# Patient Record
Sex: MALE | Race: WHITE | Employment: UNEMPLOYED | URBAN - METROPOLITAN AREA
[De-identification: names, ages, dates, MRNs, and addresses within clinical notes are randomized per-mention and may not be internally consistent; named-entity substitution may affect disease eponyms.]

---

## 2018-01-31 ENCOUNTER — APPOINTMENT (OUTPATIENT)
Dept: CT IMAGING | Age: 53
End: 2018-01-31
Attending: EMERGENCY MEDICINE
Payer: COMMERCIAL

## 2018-01-31 ENCOUNTER — HOSPITAL ENCOUNTER (EMERGENCY)
Age: 53
Discharge: OTHER INSTITUTION | End: 2018-01-31
Attending: EMERGENCY MEDICINE
Payer: COMMERCIAL

## 2018-01-31 ENCOUNTER — APPOINTMENT (OUTPATIENT)
Dept: GENERAL RADIOLOGY | Age: 53
End: 2018-01-31
Attending: EMERGENCY MEDICINE
Payer: COMMERCIAL

## 2018-01-31 VITALS
OXYGEN SATURATION: 96 % | TEMPERATURE: 97.7 F | RESPIRATION RATE: 18 BRPM | HEIGHT: 75 IN | BODY MASS INDEX: 34.82 KG/M2 | SYSTOLIC BLOOD PRESSURE: 142 MMHG | DIASTOLIC BLOOD PRESSURE: 71 MMHG | HEART RATE: 75 BPM | WEIGHT: 280 LBS

## 2018-01-31 DIAGNOSIS — R42 VERTIGO: Primary | ICD-10-CM

## 2018-01-31 DIAGNOSIS — R11.10 VOMITING, INTRACTABILITY OF VOMITING NOT SPECIFIED, PRESENCE OF NAUSEA NOT SPECIFIED, UNSPECIFIED VOMITING TYPE: ICD-10-CM

## 2018-01-31 LAB
ALBUMIN SERPL-MCNC: 3.6 G/DL (ref 3.5–5)
ALBUMIN/GLOB SERPL: 1.1 {RATIO} (ref 1.1–2.2)
ALP SERPL-CCNC: 76 U/L (ref 45–117)
ALT SERPL-CCNC: 24 U/L (ref 12–78)
ANION GAP SERPL CALC-SCNC: 10 MMOL/L (ref 5–15)
APPEARANCE UR: CLEAR
AST SERPL-CCNC: 13 U/L (ref 15–37)
ATRIAL RATE: 61 BPM
BACTERIA URNS QL MICRO: NEGATIVE /HPF
BASOPHILS # BLD: 0.1 K/UL (ref 0–0.1)
BASOPHILS NFR BLD: 1 % (ref 0–1)
BILIRUB SERPL-MCNC: 0.6 MG/DL (ref 0.2–1)
BILIRUB UR QL: NEGATIVE
BUN SERPL-MCNC: 14 MG/DL (ref 6–20)
BUN/CREAT SERPL: 15 (ref 12–20)
CALCIUM SERPL-MCNC: 8.4 MG/DL (ref 8.5–10.1)
CALCULATED P AXIS, ECG09: 1 DEGREES
CALCULATED R AXIS, ECG10: -43 DEGREES
CALCULATED T AXIS, ECG11: 65 DEGREES
CHLORIDE SERPL-SCNC: 104 MMOL/L (ref 97–108)
CO2 SERPL-SCNC: 24 MMOL/L (ref 21–32)
COLOR UR: ABNORMAL
CREAT SERPL-MCNC: 0.96 MG/DL (ref 0.7–1.3)
DIAGNOSIS, 93000: NORMAL
DIFFERENTIAL METHOD BLD: ABNORMAL
EOSINOPHIL # BLD: 0.4 K/UL (ref 0–0.4)
EOSINOPHIL NFR BLD: 4 % (ref 0–7)
EPITH CASTS URNS QL MICRO: ABNORMAL /LPF
ERYTHROCYTE [DISTWIDTH] IN BLOOD BY AUTOMATED COUNT: 12.7 % (ref 11.5–14.5)
GLOBULIN SER CALC-MCNC: 3.3 G/DL (ref 2–4)
GLUCOSE BLD STRIP.AUTO-MCNC: 334 MG/DL (ref 65–100)
GLUCOSE SERPL-MCNC: 336 MG/DL (ref 65–100)
GLUCOSE UR STRIP.AUTO-MCNC: >1000 MG/DL
HCT VFR BLD AUTO: 40.2 % (ref 36.6–50.3)
HGB BLD-MCNC: 13.8 G/DL (ref 12.1–17)
HGB UR QL STRIP: NEGATIVE
HYALINE CASTS URNS QL MICRO: ABNORMAL /LPF (ref 0–5)
IMM GRANULOCYTES # BLD: 0.3 K/UL (ref 0–0.04)
IMM GRANULOCYTES NFR BLD AUTO: 3 % (ref 0–0.5)
KETONES UR QL STRIP.AUTO: 40 MG/DL
LACTATE SERPL-SCNC: 2 MMOL/L (ref 0.4–2)
LEUKOCYTE ESTERASE UR QL STRIP.AUTO: NEGATIVE
LIPASE SERPL-CCNC: 113 U/L (ref 73–393)
LYMPHOCYTES # BLD: 1.7 K/UL (ref 0.8–3.5)
LYMPHOCYTES NFR BLD: 17 % (ref 12–49)
MAGNESIUM SERPL-MCNC: 1.6 MG/DL (ref 1.6–2.4)
MCH RBC QN AUTO: 29.2 PG (ref 26–34)
MCHC RBC AUTO-ENTMCNC: 34.3 G/DL (ref 30–36.5)
MCV RBC AUTO: 85 FL (ref 80–99)
MONOCYTES # BLD: 0.6 K/UL (ref 0–1)
MONOCYTES NFR BLD: 6 % (ref 5–13)
NEUTS SEG # BLD: 6.9 K/UL (ref 1.8–8)
NEUTS SEG NFR BLD: 69 % (ref 32–75)
NITRITE UR QL STRIP.AUTO: NEGATIVE
NRBC # BLD: 0 K/UL (ref 0–0.01)
NRBC BLD-RTO: 0 PER 100 WBC
P-R INTERVAL, ECG05: 172 MS
PH UR STRIP: 5 [PH] (ref 5–8)
PLATELET # BLD AUTO: 236 K/UL (ref 150–400)
PMV BLD AUTO: 9.7 FL (ref 8.9–12.9)
POTASSIUM SERPL-SCNC: 3.6 MMOL/L (ref 3.5–5.1)
PROT SERPL-MCNC: 6.9 G/DL (ref 6.4–8.2)
PROT UR STRIP-MCNC: NEGATIVE MG/DL
Q-T INTERVAL, ECG07: 446 MS
QRS DURATION, ECG06: 114 MS
QTC CALCULATION (BEZET), ECG08: 448 MS
RBC # BLD AUTO: 4.73 M/UL (ref 4.1–5.7)
RBC #/AREA URNS HPF: ABNORMAL /HPF (ref 0–5)
RBC MORPH BLD: ABNORMAL
SERVICE CMNT-IMP: ABNORMAL
SODIUM SERPL-SCNC: 138 MMOL/L (ref 136–145)
SP GR UR REFRACTOMETRY: 1.02 (ref 1–1.03)
TROPONIN I SERPL-MCNC: <0.04 NG/ML
UA: UC IF INDICATED,UAUC: ABNORMAL
UROBILINOGEN UR QL STRIP.AUTO: 0.2 EU/DL (ref 0.2–1)
VENTRICULAR RATE, ECG03: 61 BPM
WBC # BLD AUTO: 10 K/UL (ref 4.1–11.1)
WBC URNS QL MICRO: ABNORMAL /HPF (ref 0–4)

## 2018-01-31 PROCEDURE — 80053 COMPREHEN METABOLIC PANEL: CPT | Performed by: EMERGENCY MEDICINE

## 2018-01-31 PROCEDURE — 84484 ASSAY OF TROPONIN QUANT: CPT | Performed by: EMERGENCY MEDICINE

## 2018-01-31 PROCEDURE — G8979 MOBILITY GOAL STATUS: HCPCS

## 2018-01-31 PROCEDURE — 74011000258 HC RX REV CODE- 258: Performed by: EMERGENCY MEDICINE

## 2018-01-31 PROCEDURE — 97530 THERAPEUTIC ACTIVITIES: CPT

## 2018-01-31 PROCEDURE — G8978 MOBILITY CURRENT STATUS: HCPCS

## 2018-01-31 PROCEDURE — 93005 ELECTROCARDIOGRAM TRACING: CPT

## 2018-01-31 PROCEDURE — 83735 ASSAY OF MAGNESIUM: CPT | Performed by: EMERGENCY MEDICINE

## 2018-01-31 PROCEDURE — 36415 COLL VENOUS BLD VENIPUNCTURE: CPT | Performed by: EMERGENCY MEDICINE

## 2018-01-31 PROCEDURE — 85025 COMPLETE CBC W/AUTO DIFF WBC: CPT | Performed by: EMERGENCY MEDICINE

## 2018-01-31 PROCEDURE — 74011000250 HC RX REV CODE- 250: Performed by: EMERGENCY MEDICINE

## 2018-01-31 PROCEDURE — 74011250636 HC RX REV CODE- 250/636

## 2018-01-31 PROCEDURE — 74022 RADEX COMPL AQT ABD SERIES: CPT

## 2018-01-31 PROCEDURE — 74011250637 HC RX REV CODE- 250/637: Performed by: EMERGENCY MEDICINE

## 2018-01-31 PROCEDURE — 82962 GLUCOSE BLOOD TEST: CPT

## 2018-01-31 PROCEDURE — 70450 CT HEAD/BRAIN W/O DYE: CPT

## 2018-01-31 PROCEDURE — 96365 THER/PROPH/DIAG IV INF INIT: CPT

## 2018-01-31 PROCEDURE — 81001 URINALYSIS AUTO W/SCOPE: CPT | Performed by: EMERGENCY MEDICINE

## 2018-01-31 PROCEDURE — 97162 PT EVAL MOD COMPLEX 30 MIN: CPT

## 2018-01-31 PROCEDURE — 83690 ASSAY OF LIPASE: CPT | Performed by: EMERGENCY MEDICINE

## 2018-01-31 PROCEDURE — 74011250636 HC RX REV CODE- 250/636: Performed by: EMERGENCY MEDICINE

## 2018-01-31 PROCEDURE — 99285 EMERGENCY DEPT VISIT HI MDM: CPT

## 2018-01-31 PROCEDURE — G8980 MOBILITY D/C STATUS: HCPCS

## 2018-01-31 PROCEDURE — 96375 TX/PRO/DX INJ NEW DRUG ADDON: CPT

## 2018-01-31 PROCEDURE — 83605 ASSAY OF LACTIC ACID: CPT | Performed by: EMERGENCY MEDICINE

## 2018-01-31 RX ORDER — ONDANSETRON 2 MG/ML
INJECTION INTRAMUSCULAR; INTRAVENOUS
Status: COMPLETED
Start: 2018-01-31 | End: 2018-01-31

## 2018-01-31 RX ORDER — ONDANSETRON 2 MG/ML
4 INJECTION INTRAMUSCULAR; INTRAVENOUS
Status: COMPLETED | OUTPATIENT
Start: 2018-01-31 | End: 2018-01-31

## 2018-01-31 RX ORDER — MECLIZINE HYDROCHLORIDE 25 MG/1
25 TABLET ORAL
Qty: 15 TAB | Refills: 0 | Status: SHIPPED | OUTPATIENT
Start: 2018-01-31 | End: 2018-02-10

## 2018-01-31 RX ORDER — PROMETHAZINE HYDROCHLORIDE 50 MG/1
50 SUPPOSITORY RECTAL
Qty: 12 SUPPOSITORY | Refills: 0 | Status: SHIPPED | OUTPATIENT
Start: 2018-01-31 | End: 2019-03-26 | Stop reason: ALTCHOICE

## 2018-01-31 RX ORDER — MECLIZINE HCL 12.5 MG 12.5 MG/1
25 TABLET ORAL
Status: COMPLETED | OUTPATIENT
Start: 2018-01-31 | End: 2018-01-31

## 2018-01-31 RX ORDER — DIAZEPAM 10 MG/2ML
5 INJECTION INTRAMUSCULAR
Status: DISCONTINUED | OUTPATIENT
Start: 2018-01-31 | End: 2018-01-31

## 2018-01-31 RX ORDER — DIAZEPAM 5 MG/1
5 TABLET ORAL
Status: COMPLETED | OUTPATIENT
Start: 2018-01-31 | End: 2018-01-31

## 2018-01-31 RX ADMIN — PROMETHAZINE HYDROCHLORIDE 12.5 MG: 25 INJECTION, SOLUTION INTRAMUSCULAR; INTRAVENOUS at 14:27

## 2018-01-31 RX ADMIN — DIAZEPAM 5 MG: 5 TABLET ORAL at 16:22

## 2018-01-31 RX ADMIN — ONDANSETRON 4 MG: 2 INJECTION INTRAMUSCULAR; INTRAVENOUS at 12:05

## 2018-01-31 RX ADMIN — MECLIZINE 25 MG: 12.5 TABLET ORAL at 16:23

## 2018-01-31 RX ADMIN — ONDANSETRON HYDROCHLORIDE 4 MG: 2 INJECTION, SOLUTION INTRAMUSCULAR; INTRAVENOUS at 12:05

## 2018-01-31 RX ADMIN — SODIUM CHLORIDE 5 MG: 9 INJECTION INTRAMUSCULAR; INTRAVENOUS; SUBCUTANEOUS at 13:03

## 2018-01-31 NOTE — ED PROVIDER NOTES
EMERGENCY DEPARTMENT HISTORY AND PHYSICAL EXAM      Date: 1/31/2018  Patient Name: Daniel Encinas    History of Presenting Illness     Chief Complaint   Patient presents with    Syncope     patient had a near syncopal event while in the shower this morning, denies CP       History Provided By: Patient    HPI: Daniel Encinas, 46 y.o. male with PMHx significant for DM, HTN, HLD, presents via EMS to the ED with cc of near syncope this morning. Pt states that this morning he began to feel mild to moderate, acute lightheadedness after showering followed by onset of nausea and intermittent vomiting. He states that he laid down on the floor of the bathroom as a result followed by near-syncope. He notes that he has a hx of T2 DM but has not been checking his blood glucose levels recently. He denies any use of supplemental oxygen at home. En route, pt was provided Zofran 4 mg by EMS without relief of nausea. He notes symptoms are worse with movement and have remained constant since this morning. Blood glucose was 284 with EMS. He denies any CP, abdominal pain, SOB, or OSMAN. PCP: Lele Washington MD    There are no other complaints, changes, or physical findings at this time. Current Outpatient Prescriptions   Medication Sig Dispense Refill    meclizine (ANTIVERT) 25 mg tablet Take 1 Tab by mouth three (3) times daily as needed for up to 10 days. 15 Tab 0    promethazine (PHENERGAN) 50 mg suppository Insert 1 Suppository into rectum every six (6) hours as needed for Nausea. 12 Suppository 0    metFORMIN (GLUCOPHAGE) 500 mg tablet Take 1,000 mg by mouth two (2) times daily (with meals).  glipiZIDE (GLUCOTROL) 5 mg tablet Take 10 mg by mouth two (2) times a day.  lisinopril (PRINIVIL, ZESTRIL) 20 mg tablet Take 20 mg by mouth daily.  simvastatin (ZOCOR) 20 mg tablet Take 20 mg by mouth nightly.         cyclobenzaprine (FLEXERIL) 10 mg tablet Take 1 Tab by mouth three (3) times daily as needed for Muscle Spasm(s) for 12 doses. 12 Tab 0       Past History     Past Medical History:  Past Medical History:   Diagnosis Date    Diabetes ([facility] [address]     Essential hypertension     Hyperlipidemia     Hypertension     Other ill-defined conditions(799.89)     high cholesterol       Past Surgical History:  History reviewed. No pertinent surgical history. Family History:  Family History   Problem Relation Age of Onset    Coronary Artery Disease Maternal Grandmother     Coronary Artery Disease Paternal Grandmother        Social History:  Social History   Substance Use Topics    Smoking status: Never Smoker    Smokeless tobacco: Current User      Comment: snuff    Alcohol use No       Allergies:  No Known Allergies      Review of Systems   Review of Systems   Constitutional: Negative for chills and fever. HENT: Negative for congestion, mouth sores and sore throat. Eyes: Negative for visual disturbance. Respiratory: Negative for cough and shortness of breath. Cardiovascular: Negative for chest pain and leg swelling. Gastrointestinal: Positive for nausea and vomiting. Negative for abdominal pain, blood in stool and diarrhea. Endocrine: Negative for polyuria. Genitourinary: Negative for dysuria and testicular pain. Musculoskeletal: Negative for arthralgias, joint swelling and myalgias. Skin: Negative for rash. Allergic/Immunologic: Negative for immunocompromised state. Neurological: Positive for syncope (near) and light-headedness. Negative for weakness and headaches. Hematological: Does not bruise/bleed easily. Psychiatric/Behavioral: Negative for confusion. Physical Exam   Physical Exam   Constitutional: He is oriented to person, place, and time. He appears well-developed and well-nourished. HENT:   Head: Normocephalic and atraumatic. Moist mucous membranes   Eyes: Conjunctivae are normal. Pupils are equal, round, and reactive to light. Right eye exhibits no discharge.  Left eye exhibits no discharge. Neck: Normal range of motion. Neck supple. No tracheal deviation present. Cardiovascular: Normal rate, regular rhythm and normal heart sounds. No murmur heard. Pulmonary/Chest: Effort normal and breath sounds normal. No respiratory distress. He has no wheezes. He has no rales. Abdominal: Soft. Bowel sounds are normal. There is no tenderness. There is no rebound and no guarding. Musculoskeletal: Normal range of motion. He exhibits no edema, tenderness or deformity. Neurological: He is alert and oriented to person, place, and time. Skin: Skin is dry. No rash noted. No erythema. There is pallor. Extremities cool to touch. Psychiatric: His behavior is normal.   Nursing note and vitals reviewed.       Diagnostic Study Results     Labs -  Recent Results (from the past 12 hour(s))   GLUCOSE, POC    Collection Time: 01/31/18 11:41 AM   Result Value Ref Range    Glucose (POC) 334 (H) 65 - 100 mg/dL    Performed by Mirta Gonzalez    EKG, 12 LEAD, INITIAL    Collection Time: 01/31/18 11:53 AM   Result Value Ref Range    Ventricular Rate 61 BPM    Atrial Rate 61 BPM    P-R Interval 172 ms    QRS Duration 114 ms    Q-T Interval 446 ms    QTC Calculation (Bezet) 448 ms    Calculated P Axis 1 degrees    Calculated R Axis -43 degrees    Calculated T Axis 65 degrees    Diagnosis       Normal sinus rhythm  Left axis deviation  Right bundle branch block      Confirmed by Dimitri Clark ([id number]) on 1/31/2018 5:54:49 PM     CBC WITH AUTOMATED DIFF    Collection Time: 01/31/18 12:20 PM   Result Value Ref Range    WBC 10.0 4.1 - 11.1 K/uL    RBC 4.73 4.10 - 5.70 M/uL    HGB 13.8 12.1 - 17.0 g/dL    HCT 40.2 36.6 - 50.3 %    MCV 85.0 80.0 - 99.0 FL    MCH 29.2 26.0 - 34.0 PG    MCHC 34.3 30.0 - 36.5 g/dL    RDW 12.7 11.5 - 14.5 %    PLATELET 885 257 - 524 K/uL    MPV 9.7 8.9 - 12.9 FL    NRBC 0.0 0  WBC    ABSOLUTE NRBC 0.00 0.00 - 0.01 K/uL    NEUTROPHILS 69 32 - 75 %    LYMPHOCYTES 17 12 - 49 %    MONOCYTES 6 5 - 13 %    EOSINOPHILS 4 0 - 7 %    BASOPHILS 1 0 - 1 %    IMMATURE GRANULOCYTES 3 (H) 0.0 - 0.5 %    ABS. NEUTROPHILS 6.9 1.8 - 8.0 K/UL    ABS. LYMPHOCYTES 1.7 0.8 - 3.5 K/UL    ABS. MONOCYTES 0.6 0.0 - 1.0 K/UL    ABS. EOSINOPHILS 0.4 0.0 - 0.4 K/UL    ABS. BASOPHILS 0.1 0.0 - 0.1 K/UL    ABS. IMM. GRANS. 0.3 (H) 0.00 - 0.04 K/UL    DF SMEAR SCANNED      RBC COMMENTS NORMOCYTIC, NORMOCHROMIC     LACTIC ACID    Collection Time: 01/31/18 12:20 PM   Result Value Ref Range    Lactic acid 2.0 0.4 - 2.0 MMOL/L   METABOLIC PANEL, COMPREHENSIVE    Collection Time: 01/31/18  1:51 PM   Result Value Ref Range    Sodium 138 136 - 145 mmol/L    Potassium 3.6 3.5 - 5.1 mmol/L    Chloride 104 97 - 108 mmol/L    CO2 24 21 - 32 mmol/L    Anion gap 10 5 - 15 mmol/L    Glucose 336 (H) 65 - 100 mg/dL    BUN 14 6 - 20 MG/DL    Creatinine 0.96 0.70 - 1.30 MG/DL    BUN/Creatinine ratio 15 12 - 20      GFR est AA >60 >60 ml/min/1.73m2    GFR est non-AA >60 >60 ml/min/1.73m2    Calcium 8.4 (L) 8.5 - 10.1 MG/DL    Bilirubin, total 0.6 0.2 - 1.0 MG/DL    ALT (SGPT) 24 12 - 78 U/L    AST (SGOT) 13 (L) 15 - 37 U/L    Alk.  phosphatase 76 45 - 117 U/L    Protein, total 6.9 6.4 - 8.2 g/dL    Albumin 3.6 3.5 - 5.0 g/dL    Globulin 3.3 2.0 - 4.0 g/dL    A-G Ratio 1.1 1.1 - 2.2     MAGNESIUM    Collection Time: 01/31/18  1:51 PM   Result Value Ref Range    Magnesium 1.6 1.6 - 2.4 mg/dL   TROPONIN I    Collection Time: 01/31/18  1:51 PM   Result Value Ref Range    Troponin-I, Qt. <0.04 <0.05 ng/mL   LIPASE    Collection Time: 01/31/18  1:51 PM   Result Value Ref Range    Lipase 113 73 - 393 U/L   URINALYSIS W/ REFLEX CULTURE    Collection Time: 01/31/18  3:28 PM   Result Value Ref Range    Color YELLOW/STRAW      Appearance CLEAR CLEAR      Specific gravity 1.020 1.003 - 1.030      pH (UA) 5.0 5.0 - 8.0      Protein NEGATIVE  NEG mg/dL    Glucose >1000 (A) NEG mg/dL    Ketone 40 (A) NEG mg/dL    Bilirubin NEGATIVE  NEG Blood NEGATIVE  NEG      Urobilinogen 0.2 0.2 - 1.0 EU/dL    Nitrites NEGATIVE  NEG      Leukocyte Esterase NEGATIVE  NEG      UA:UC IF INDICATED CULTURE NOT INDICATED BY UA RESULT CNI      WBC 0-4 0 - 4 /hpf    RBC 0-5 0 - 5 /hpf    Epithelial cells FEW FEW /lpf    Bacteria NEGATIVE  NEG /hpf    Hyaline cast 0-2 0 - 5 /lpf       Radiologic Studies -  CXR Results  (Last 48 hours)               01/31/18 1329  XR ABD ACUTE W 1 V CHEST Final result    Impression:  IMPRESSION: No acute findings. Narrative:  EXAM:  XR ABD ACUTE W 1 V CHEST       INDICATION:  Nausea, vomiting since this morning. COMPARISON: None. FINDINGS: The upright chest radiograph demonstrates clear lungs and normal   cardiac and mediastinal contours. There is no pleural effusion or free air under   the diaphragm. Supine and left decubitus views of the abdomen demonstrate a nonobstructive   bowel gas pattern. There is no free intraperitoneal air. Mild retained fecal   material is shown throughout the colon. Mild lumbar spine degenerative changes   are shown. Medical Decision Making   I am the first provider for this patient. I reviewed the vital signs, available nursing notes, past medical history, past surgical history, family history and social history. Vital Signs-Reviewed the patient's vital signs. Patient Vitals for the past 12 hrs:   Temp Pulse Resp BP SpO2   01/31/18 1430 - 70 18 149/78 96 %   01/31/18 1400 - 70 18 136/66 96 %   01/31/18 1304 - 67 18 144/64 91 %   01/31/18 1138 97.7 °F (36.5 °C) 65 18 156/74 99 %       Pulse Oximetry Analysis - 94% on RA    Cardiac Monitor:   Rate: 68 bpm  Rhythm: Normal Sinus Rhythm      EKG interpretation: (Preliminary) 1158  Rhythm: normal sinus rhythm and RBBB; and regular . Rate (approx.): 61; Axis: left axis deviation; CO interval: normal; QRS interval: prolonged; ST/T wave: no ischemic changes. QT/QTc: 446/448  Written by Jose Funez. Dru ED Scribe, as dictated by Simone Renteria DO. Records Reviewed: Nursing Notes, Old Medical Records and Previous Laboratory Studies    Provider Notes (Medical Decision Making):   Syncope likely related to vasovagal episode vs volume depletion. Will obtain labs, provide IV fluids, and disposition pending symptomatic treatment and lab results. ED Course:   Initial assessment performed. The patients presenting problems have been discussed, and they are in agreement with the care plan formulated and outlined with them. I have encouraged them to ask questions as they arise throughout their visit. 4:33 PM  Having re-evaluated pt. When sitting him up in bed and having him turn his head, he rpeorts feeling sick. Will speak with PT to have pt evaluated. SIGN OUT NOTE:  4:53 PM  Patient's presentation, labs/imaging and plan of care was reviewed with Shree Patten MD as part of sign out. They will follow up on the result of pt's PT evaluation and discharge home pending as part of the plan discussed with the patient. Shree Patten MD's assistance in completion of this plan is greatly appreciated but it should be noted that I will be the provider of record for this patient. Simone Renteria DO.    5:02 PM  Pt ambulated by PT. She states that he had slight increase in symptoms with left rotation of head but symptoms were not described as vertigo-dizziness. Attempted Epley maneuver but with no change in symptoms. Pt with poor balance while sitting and unable to stand safely with significant assistance of 2 people. PT states central workup of central cause warranted. Disposition:    TRANSFER NOTE:  7:18 PM  Pt is being transferred to ED at [facility], transfer accepted by Dr. Maria Dolores Laurent. The reasons for pt's transfer have been discussed with the pt and available family. They convey agreement and understanding for the need to be transferred as explained to them by Shree Patten MD.      PLAN:  1.    Current Discharge Medication List      START taking these medications    Details   meclizine (ANTIVERT) 25 mg tablet Take 1 Tab by mouth three (3) times daily as needed for up to 10 days. Qty: 15 Tab, Refills: 0      promethazine (PHENERGAN) 50 mg suppository Insert 1 Suppository into rectum every six (6) hours as needed for Nausea. Qty: 12 Suppository, Refills: 0           2. Follow-up Information     Follow up With Details Comments Olivia Almanzar III, MD Call in 1 day  [address] [facility] [phone]      [facility] EMERGENCY DEPT  If symptoms worsen [address]  [address] [facility]    Emily Arshad MD Call in 1 day  [address] [facility]  [phone]          Return to ED if worse     Diagnosis     Clinical Impression:   1. Vertigo    2. Vomiting, intractability of vomiting not specified, presence of nausea not specified, unspecified vomiting type        Attestations: This note is prepared by Elvin Lovett, acting as Scribe for Luis Karimi DO. Luis Karimi DO: The scribe's documentation has been prepared under my direction and personally reviewed by me in its entirety. I confirm that the note above accurately reflects all work, treatment, procedures, and medical decision making performed by me.

## 2018-01-31 NOTE — ED NOTES
Patient arrival via EMS, near syncope during shower, patient found seated in the shower,  , nausea, 4 mg Zofran given by EMS. Patient denies CP.

## 2018-01-31 NOTE — ED NOTES
Patient has had 2 episodes of vomiting, states he feels dizzy, room is moving back and forth. Denies pain.

## 2018-01-31 NOTE — DISCHARGE INSTRUCTIONS
Dizziness: Care Instructions  Your Care Instructions  Dizziness is the feeling of unsteadiness or fuzziness in your head. It is different than having vertigo, which is a feeling that the room is spinning or that you are moving or falling. It is also different from lightheadedness, which is the feeling that you are about to faint. It can be hard to know what causes dizziness. Some people feel dizzy when they have migraine headaches. Sometimes bouts of flu can make you feel dizzy. Some medical conditions, such as heart problems or high blood pressure, can make you feel dizzy. Many medicines can cause dizziness, including medicines for high blood pressure, pain, or anxiety. If a medicine causes your symptoms, your doctor may recommend that you stop or change the medicine. If it is a problem with your heart, you may need medicine to help your heart work better. If there is no clear reason for your symptoms, your doctor may suggest watching and waiting for a while to see if the dizziness goes away on its own. Follow-up care is a key part of your treatment and safety. Be sure to make and go to all appointments, and call your doctor if you are having problems. It's also a good idea to know your test results and keep a list of the medicines you take. How can you care for yourself at home? · If your doctor recommends or prescribes medicine, take it exactly as directed. Call your doctor if you think you are having a problem with your medicine. · Do not drive while you feel dizzy. · Try to prevent falls. Steps you can take include:  ¨ Using nonskid mats, adding grab bars near the tub, and using night-lights. ¨ Clearing your home so that walkways are free of anything you might trip on. ¨ Letting family and friends know that you have been feeling dizzy. This will help them know how to help you. When should you call for help? Call 911 anytime you think you may need emergency care.  For example, call if:  ? · You passed out (lost consciousness). ? · You have dizziness along with symptoms of a heart attack. These may include:  ¨ Chest pain or pressure, or a strange feeling in the chest.  ¨ Sweating. ¨ Shortness of breath. ¨ Nausea or vomiting. ¨ Pain, pressure, or a strange feeling in the back, neck, jaw, or upper belly or in one or both shoulders or arms. ¨ Lightheadedness or sudden weakness. ¨ A fast or irregular heartbeat. ? · You have symptoms of a stroke. These may include:  ¨ Sudden numbness, tingling, weakness, or loss of movement in your face, arm, or leg, especially on only one side of your body. ¨ Sudden vision changes. ¨ Sudden trouble speaking. ¨ Sudden confusion or trouble understanding simple statements. ¨ Sudden problems with walking or balance. ¨ A sudden, severe headache that is different from past headaches. ?Call your doctor now or seek immediate medical care if:  ? · You feel dizzy and have a fever, headache, or ringing in your ears. ? · You have new or increased nausea and vomiting. ? · Your dizziness does not go away or comes back. ? Watch closely for changes in your health, and be sure to contact your doctor if:  ? · You do not get better as expected. Where can you learn more? Go to http://ernestina-lidia.info/. Enter W743 in the search box to learn more about \"Dizziness: Care Instructions. \"  Current as of: March 20, 2017  Content Version: 11.4  © 4646-3195 [employer]. Care instructions adapted under license by "[employer]" (which disclaims liability or warranty for this information). If you have questions about a medical condition or this instruction, always ask your healthcare professional. [place] [address] any warranty or liability for your use of this information.          Nausea and Vomiting: Care Instructions  Your Care Instructions    When you are nauseated, you may feel weak and sweaty and notice a lot of saliva in your mouth. Nausea often leads to vomiting. Most of the time you do not need to worry about nausea and vomiting, but they can be signs of other illnesses. Two common causes of nausea and vomiting are stomach flu and food poisoning. Nausea and vomiting from viral stomach flu will usually start to improve within 24 hours. Nausea and vomiting from food poisoning may last from 12 to 48 hours. The doctor has checked you carefully, but problems can develop later. If you notice any problems or new symptoms, get medical treatment right away. Follow-up care is a key part of your treatment and safety. Be sure to make and go to all appointments, and call your doctor if you are having problems. It's also a good idea to know your test results and keep a list of the medicines you take. How can you care for yourself at home? · To prevent dehydration, drink plenty of fluids, enough so that your urine is light yellow or clear like water. Choose water and other caffeine-free clear liquids until you feel better. If you have kidney, heart, or liver disease and have to limit fluids, talk with your doctor before you increase the amount of fluids you drink. · Rest in bed until you feel better. · When you are able to eat, try clear soups, mild foods, and liquids until all symptoms are gone for 12 to 48 hours. Other good choices include dry toast, crackers, cooked cereal, and gelatin dessert, such as Jell-O. When should you call for help? Call 911 anytime you think you may need emergency care. For example, call if:  ? · You passed out (lost consciousness). ?Call your doctor now or seek immediate medical care if:  ? · You have symptoms of dehydration, such as:  ¨ Dry eyes and a dry mouth. ¨ Passing only a little dark urine. ¨ Feeling thirstier than usual.   ? · You have new or worsening belly pain. ? · You have a new or higher fever. ? · You vomit blood or what looks like coffee grounds. ? Watch closely for changes in your health, and be sure to contact your doctor if:  ? · You have ongoing nausea and vomiting. ? · Your vomiting is getting worse. ? · Your vomiting lasts longer than 2 days. ? · You are not getting better as expected. Where can you learn more? Go to http://ernestina-lidia.info/. Enter 25 [id number] in the search box to learn more about \"Nausea and Vomiting: Care Instructions. \"  Current as of: March 20, 2017  Content Version: 11.4  © 5460-7508 [employer]. Care instructions adapted under license by [employer] (which disclaims liability or warranty for this information). If you have questions about a medical condition or this instruction, always ask your healthcare professional. [address] any warranty or liability for your use of this information.

## 2018-01-31 NOTE — ED NOTES
Bedside shift change report given to Fauzia  (oncoming nurse) by Margie Roman (offgoing nurse). Report included the following information ED Summary. Pt provided urine specimen; dizziness noted when sitting, no vomiting at this time. Case discussed with MD.  Myles order med.

## 2018-01-31 NOTE — ED NOTES
Patient is on bedside monitor times 3. Patient resting in bed in position of comfort with call bell in reach, family at bedside.

## 2018-01-31 NOTE — PROGRESS NOTES
Pt is a 45 y/o  male who presented to the ED via EMS with a cc of near syncope this morning. CM met with pt re: assessment. CM met with pt, introduced self, role. Pt verbalized understanding. Pt verified demographic information on chart. CM updated pt's emergency contact as requested by pt. Pt confirmed he has [employer]. CM informed attending Monica Alvarado of [employer] for disposition. Pt stated he has no further questions or needs at this time. CM to continue to offer support, discharge planning as needed.      Richie Doshi, MSW  [facility]  [phone]

## 2018-01-31 NOTE — ED NOTES
Pt reports nausea and dry heaves. Medicated as ordered. Pt drank gingerale x2 after reporting nausea with some relief.

## 2018-01-31 NOTE — PROGRESS NOTES
physical Therapy Emergency Department EVALUATION with recommended admission  Patient: Keon Ruiz (48 y.o. male)  Date: 1/31/2018  Primary Diagnosis: There are no admission diagnoses documented for this encounter. Precautions:      ASSESSMENT :  Based on the objective data described below, the patient presents with c/o \"dizziness\" this am needing to lay himself on the floor of the bathroom due to inability to stand up. Initially thought to be possibly near syncope, but pt now c/o symptoms with head turns. Asked by Dr. Monica Alvarado to see pt for eval.  Pt lives in one story home with mother and brother. He is normally fully independent with no device, drives for work. Currently, pt describes \"feeling like my brain is sloshing\" or \"like my head or brain is tilted backward\". Does not endorse true spinning. He describes feeling present when turning to R and L and unclear of either side worse. He states he feels totally unstable when trying to sit unsupported. Smooth pursuit, saccades, and VOR appear intact. [place] Zeeshan Cones completed with only minor increase in symptoms to the Left and only briefly different than R. No appreciable nystagmus. Did attempt epley as for L involvement but did not result in typical peripheral response of increased sxs on 1st and 3rd positions. Upon sitting, pt exhibiting poor sitting balance with truncal ataxia needing min A for safety. Trunkal movement with latency as well. Attempted standing and pt with extremely poor control, lurching forward and unable to correct with need of max A of 1 and mod A of 1 to gain control and safely return to sitting. Upon sitting, continues with truncal ataxia. Pt returned to bed with assist of 2 for safety. At this time, pt's symptoms not presenting as typical for peripheral involvement . Rounded with MD and would recommend further assess, questionable for central causes given above presentation.  Pt would be unsafe at this time for d/c as he is unable to control sitting and standing and unable to amb. Admission for this patient is recommended with continued acute therapy. PLAN :  Frequency/Duration: Pending admission, the patient will be followed by physical therapy 5 times a week to address goals. If admitted, Recommendations and Planned Interventions:  [x]           Bed Mobility Training             []    Neuromuscular Re-Education  [x]           Transfer Training                   []    Orthotic/Prosthetic Training  [x]           Gait Training                         []    Modalities  [x]           Therapeutic Exercises           []    Edema Management/Control  [x]           Therapeutic Activities            [x]    Patient and Family Training/Education  []           Other (comment):      Discharge Recommendations:     []   Home with family  []   Skilled nursing facility  []   Admission to hospital with rehab likely needed  []   Inpatient rehab referral  []   Outpatient physical therapy referral  [x]   Other:dependent upon final dx and progress    Further Equipment Recommendations for Discharge:   []   Rolling walker with 5\" wheels  []   Crutches   []   [place]   []   Wheelchair   [x]   Other: TBD    COMMUNICATION/EDUCATION:   Communication/Collaboration:  [x]   Fall prevention education was provided and the patient/caregiver indicated understanding. [x]   Patient/family have participated as able and agree with findings and recommendations. []   Patient is unable to participate in plan of care at this time. Findings and recommendations were discussed with: MD physician and care manager       SUBJECTIVE:   Patient stated It feels like my brain is sloshing around or tilted.     OBJECTIVE DATA SUMMARY:   HISTORY:    Past Medical History:   Diagnosis Date    Diabetes ([address]     Essential hypertension     Hyperlipidemia     Hypertension     Other ill-defined conditions(799.89)     high cholesterol   History reviewed.  No pertinent surgical history. Prior Level of Function/Home Situation: Pt lives in one story home with mother and brother. He is normally fully independent with no device, drives for work. Personal factors and/or comorbidities impacting plan of care:     Home Situation  Home Environment: Private residence  # Steps to Enter: 3  Rails to Enter: Yes  One/Two Story Residence: One story  Living Alone: No  Support Systems: Family member(s) (lives with mother and brother)  Patient Expects to be Discharged to[de-identified] Private residence  Current DME Used/Available at Home: None    EXAMINATION/PRESENTATION/DECISION MAKING:   Critical Behavior:  Neurologic State: Alert  Orientation Level: Oriented X4  Cognition: Follows commands       Range Of Motion:  AROM: Within functional limits           PROM: Within functional limits           Strength:    Strength: Within functional limits                    Tone & Sensation:   Tone: Normal              Sensation: Intact               Coordination:  Coordination:  (slowed heel to shin L and +trunkal ataxia on sitting)  Vision:    does not describe oscillopsia      Functional Mobility:  Bed Mobility:  Rolling: Contact guard assistance  Supine to Sit: Minimum assistance;Assist x2  Sit to Supine: Minimum assistance;Assist x2     Transfers:  Sit to Stand:  Moderate assistance;Maximum assistance;Assist x2 (min to initiate w/ severe lurch forward/inability to [address])  Stand to Sit: Moderate assistance;Maximum assistance;Assist x2                       Balance:   Sitting: Impaired  Sitting - Static: Poor (constant support)  Sitting - Dynamic: Poor (constant support)  Standing: Impaired  Standing - Static: Poor  Standing - Dynamic :  (NT)  Ambulation/Gait Training:              Gait Description (WDL): Exceptions to WDL (unable to amb at this time)                            Special Tests:  Barthel Index:    Bathin  Bladder: 10  Bowels: 10  Groomin  Dressin  Feedin  Mobility: 0  Stairs: 0  Toilet Use: 0  Transfer (Bed to Chair and Back): 0  Total: 25       Barthel and G-code impairment scale:  Percentage of impairment CH  0% CI  1-19% CJ  20-39% CK  40-59% CL  60-79% CM  80-99% CN  100%   Barthel Score 0-100 100 99-80 79-60 59-40 20-39 1-19   0   Barthel Score 0-20 20 17-19 13-16 9-12 5-8 1-4 0      The Barthel ADL Index: Guidelines  1. The index should be used as a record of what a patient does, not as a record of what a patient could do. 2. The main aim is to establish degree of independence from any help, physical or verbal, however minor and for whatever reason. 3. The need for supervision renders the patient not independent. 4. A patient's performance should be established using the best available evidence. Asking the patient, friends/relatives and nurses are the usual sources, but direct observation and common sense are also important. However direct testing is not needed. 5. Usually the patient's performance over the preceding 24-48 hours is important, but occasionally longer periods will be relevant. 6. Middle categories imply that the patient supplies over 50 per cent of the effort. 7. Use of aids to be independent is allowed. Macie Go., Barthel, D.W. (2817). Functional evaluation: the Barthel Index. [facility] (14)2. RAMÍREZ Shepherd, Denise Collins., Francisco Javier Moss., [facility], [address] [place] [address] (1999). Measuring the change indisability after inpatient rehabilitation; comparison of the responsiveness of the Barthel Index and Functional [place] Measure. Journal of Neurology, Neurosurgery, and Psychiatry, 66(4), 673-722. Candance Magyar, N.JOEY.A, DEYSI Sargent, & Chalo Reddy M.A. (2004.) Assessment of post-stroke quality of life in cost-effectiveness studies: The usefulness of the Barthel Index and the EuroQoL-5D.  Quality of Life Research, 13, 427-43        In compliance with CMSs Claims Based Outcome Reporting, the following G-code set was chosen for this patient based on their primary functional limitation being treated: The outcome measure chosen to determine the severity of the functional limitation was the barthel with a score of 25/100 which was correlated with the impairment scale. ? Mobility - Walking and Moving Around:     - CURRENT STATUS: CL - 60%-79% impaired, limited or restricted    - GOAL STATUS: CL - 60%-79% impaired, limited or restricted    - D/C STATUS:  CL - 60%-79% impaired, limited or restricted     Physical Therapy Evaluation Charge Determination   History Examination Presentation Decision-Making   MEDIUM  Complexity : 1-2 comorbidities / personal factors will impact the outcome/ POC  HIGH Complexity : 4+ Standardized tests and measures addressing body structure, function, activity limitation and / or participation in recreation  HIGH Complexity : Unstable and unpredictable characteristics  MEDIUM Complexity : FOTO score of 26-74      Based on the above components, the patient evaluation is determined to be of the following complexity level: MEDIUM     Pain:  Pain Scale 1: Numeric (0 - 10)  Pain Intensity 1: 0     Activity Tolerance:   Poor, see above narrative    Please refer to the flowsheet for vital signs taken during this treatment.   After treatment:   []   Patient left in no apparent distress sitting up in chair  [x]   Patient left in no apparent distress in bed  [x]   Call bell left within reach  [x]   Nursing notified  []   Caregiver present  []   Bed alarm activated        Thank you for this referral.  Ivory Wheat, PT   Time Calculation: 27 mins

## 2018-02-01 NOTE — ED NOTES
Report given to [facility]. Care transferred at this time. Pt denies questions or concerns about transport. EMTALA documentation completed and copied. Spoke with Tobi Syed at [facility] to alert that [facility] would be leaving this hospital shortly.

## 2018-02-01 NOTE — ED NOTES
Bedside shift change report given to Jackson Ruiz (oncoming nurse) by Fauzia (offgoing nurse). Report included the following information ED Summary and MAR.

## 2018-02-01 NOTE — ED NOTES
Report called to Guadalupe Pace RN at [facility]. Will call to update with ETA of transport and with any changes. No questions or concerns expressed during report. Pt updated on status.

## 2018-02-01 NOTE — ED NOTES
Assisted Dr. Jv Laird to attempt to stand patient. Patient sat on side of bed and was unable to steady himself. Not safe to stand at this time. Patient repositioned in bed and returned to cardiac monitor x3. Patient assisted to use urinal at bedside.

## 2019-03-05 PROBLEM — E78.5 HYPERLIPIDEMIA: Chronic | Status: ACTIVE | Noted: 2019-03-05

## 2019-03-05 PROBLEM — E11.319 DIABETES WITH RETINOPATHY (HCC): Status: ACTIVE | Noted: 2019-03-05

## 2019-03-05 PROBLEM — E66.9 OBESITY (BMI 30-39.9): Chronic | Status: ACTIVE | Noted: 2019-03-05

## 2019-03-05 PROBLEM — E66.9 OBESITY (BMI 30-39.9): Status: ACTIVE | Noted: 2019-03-05

## 2019-03-05 PROBLEM — I10 HYPERTENSION: Chronic | Status: ACTIVE | Noted: 2019-03-05

## 2019-03-05 PROBLEM — I10 HYPERTENSION: Status: ACTIVE | Noted: 2019-03-05

## 2019-03-05 PROBLEM — E78.5 HYPERLIPIDEMIA: Status: ACTIVE | Noted: 2019-03-05

## 2019-03-05 PROBLEM — E11.319 DIABETES WITH RETINOPATHY (HCC): Chronic | Status: ACTIVE | Noted: 2019-03-05

## 2019-03-05 PROBLEM — Z86.73 HISTORY OF CEREBELLAR STROKE: Chronic | Status: ACTIVE | Noted: 2019-03-05

## 2019-03-05 PROBLEM — Z86.73 HISTORY OF CEREBELLAR STROKE: Status: ACTIVE | Noted: 2019-03-05

## 2019-03-26 ENCOUNTER — OFFICE VISIT (OUTPATIENT)
Dept: INTERNAL MEDICINE CLINIC | Age: 54
End: 2019-03-26

## 2019-03-26 VITALS
HEART RATE: 73 BPM | OXYGEN SATURATION: 97 % | BODY MASS INDEX: 34.32 KG/M2 | TEMPERATURE: 98 F | DIASTOLIC BLOOD PRESSURE: 80 MMHG | RESPIRATION RATE: 22 BRPM | HEIGHT: 75 IN | SYSTOLIC BLOOD PRESSURE: 120 MMHG | WEIGHT: 276 LBS

## 2019-03-26 DIAGNOSIS — E66.9 OBESITY (BMI 30-39.9): Chronic | ICD-10-CM

## 2019-03-26 DIAGNOSIS — E78.2 MIXED HYPERLIPIDEMIA: Chronic | ICD-10-CM

## 2019-03-26 DIAGNOSIS — I10 ESSENTIAL HYPERTENSION: Chronic | ICD-10-CM

## 2019-03-26 DIAGNOSIS — Z79.899 LONG-TERM USE OF HIGH-RISK MEDICATION: ICD-10-CM

## 2019-03-26 DIAGNOSIS — E11.3299 TYPE 2 DIABETES MELLITUS WITH MILD NONPROLIFERATIVE RETINOPATHY, WITHOUT LONG-TERM CURRENT USE OF INSULIN, MACULAR EDEMA PRESENCE UNSPECIFIED, UNSPECIFIED LATERALITY (HCC): Primary | Chronic | ICD-10-CM

## 2019-03-26 DIAGNOSIS — Z86.73 HISTORY OF CEREBELLAR STROKE: Chronic | ICD-10-CM

## 2019-03-26 RX ORDER — ATORVASTATIN CALCIUM 40 MG/1
40 TABLET, FILM COATED ORAL DAILY
Qty: 30 TAB | Refills: 3 | Status: SHIPPED | OUTPATIENT
Start: 2019-03-26 | End: 2019-07-17 | Stop reason: SDUPTHER

## 2019-03-26 RX ORDER — ATORVASTATIN CALCIUM 40 MG/1
TABLET, FILM COATED ORAL
COMMUNITY
Start: 2019-02-19 | End: 2019-03-26 | Stop reason: SDUPTHER

## 2019-03-26 RX ORDER — PANTOPRAZOLE SODIUM 40 MG/1
40 TABLET, DELAYED RELEASE ORAL 2 TIMES DAILY
COMMUNITY
End: 2019-07-17 | Stop reason: SDUPTHER

## 2019-03-26 RX ORDER — ASPIRIN 81 MG/1
TABLET ORAL DAILY
COMMUNITY

## 2019-03-26 NOTE — PROGRESS NOTES
Earlene Kussmaul is a 47 y.o. male presenting for New Patient Imelda [place] 1. Have you been to the ER, urgent care clinic since your last visit? Hospitalized since your last visit? No 
 
2. Have you seen or consulted any other health care providers outside of the [address] [place] [address] since your last visit? Include any pap smears or colon screening. No 
 
No flowsheet data found. No flowsheet data found. 3 most recent PHQ Screens 3/26/2019 Little interest or pleasure in doing things Not at all Feeling down, depressed, irritable, or hopeless Not at all Total Score PHQ 2 0 There are no discontinued medications.

## 2019-03-26 NOTE — PATIENT INSTRUCTIONS

## 2019-03-26 NOTE — PROGRESS NOTES
This note will not be viewable in [address] Angela George is a 47 y.o. male and presents with New Patient Johan Bryan Subjective: 
Mr. Alesia Carlos is a 29-year-old single male, a , who presents to the office today in order to establish his care and in order to follow-up on multiple medical problems. The patient previously had been followed by Dr. Darshan De Paz but due to an insurance change had to change doctors. The patient has a history of type 2 diabetes mellitus of 10 years duration. He is currently on metformin and glipizide. He tolerates this regimen without hypoglycemia. His diabetes has been complicated by retinopathy. He denies hypoglycemia. He is not checking his blood sugars presently. He did see Dr. Gema Britt for a foot exam a year ago. Blood pressure is currently managed with a no added salt diet. He denies any headaches, numbness, tingling or focal neurological problems. He has had a previous cerebellar infarct and remains on an aspirin a day. He has hypercholesterolemia currently on statin therapy. He tolerates this without muscle soreness or GI upset. He has no history of coronary artery disease and denies exertional chest pains or claudication. He has GERD currently on PPI therapy. He denies breakthrough heartburn and has had no dysphagia, early satiety or unexplained weight loss. Past Medical History:  
Diagnosis Date  Diabetes ([address]  Essential hypertension  Hyperlipidemia  Long-term use of high-risk medication 3/26/2019  Stroke [facility]) 01/2018 History reviewed. No pertinent surgical history. No Known Allergies Current Outpatient Medications Medication Sig Dispense Refill  pantoprazole (PROTONIX) 40 mg tablet Take 40 mg by mouth two (2) times a day.  atorvastatin (LIPITOR) 40 mg tablet Take 1 Tab by mouth daily. 30 Tab 3  
 aspirin delayed-release 81 mg tablet Take  by mouth daily.  metFORMIN (GLUCOPHAGE) 500 mg tablet Take 1,000 mg by mouth two (2) times daily (with meals).  glipiZIDE SR (GLUCOTROL XL) 10 mg CR tablet Take 10 mg by mouth daily. Social History Socioeconomic History  Marital status: SINGLE Spouse name: Not on file  Number of children: Not on file  Years of education: Not on file  Highest education level: Not on file Occupational History  Occupation: [employer] Tobacco Use  Smoking status: Never Smoker  Smokeless tobacco: Current User Types: Snuff  Tobacco comment: snuff Substance and Sexual Activity  Alcohol use: No  
 Drug use: No  
 
Family History Problem Relation Age of Onset  Coronary Artery Disease Maternal Grandmother  Coronary Artery Disease Paternal Grandmother  Cancer Mother   
     vulvar  Diabetes Father Health Maintenance Topic Date Due  
 HEMOGLOBIN A1C Q6M  1965  LIPID PANEL Q1  1965  Pneumococcal 0-64 years (1 of 1 - PPSV23) 02/25/1971  
 FOOT EXAM Q1  02/25/1975  MICROALBUMIN Q1  02/25/1975  
 EYE EXAM RETINAL OR DILATED  02/25/1975  
 DTaP/Tdap/Td series (1 - Tdap) 02/25/1986  Shingrix Vaccine Age 50> (1 of 2) 02/25/2015  FOBT Q 1 YEAR AGE 50-75  02/25/2015  Influenza Age 5 to Adult  08/31/2019 (Originally 8/1/2018)  Hepatitis C Screening  Completed Review of Systems Constitutional: negative for fevers, chills, anorexia and weight loss Eyes:   negative for visual disturbance and irritation ENT:   negative for tinnitus,sore throat,nasal congestion,ear pain,hoarseness Respiratory:  negative for cough, hemoptysis, dyspnea,wheezing CV:   negative for chest pain, palpitations, lower extremity edema GI:   negative for nausea, vomiting, diarrhea, abdominal pain,melena Endo:               negative for polyuria,polydipsia,polyphagia,heat intolerance Genitourinary: negative for frequency, dysuria and hematuria Integumentary: negative for rash and pruritus Hematologic:  negative for easy bruising and gum/nose bleeding Musculoskel: negative for myalgias, arthralgias, back pain, muscle weakness, joint pain Neurological:  negative for headaches, dizziness, vertigo, memory problems and gait Behavl/Psych: negative for feelings of anxiety, depression, mood changes ROS otherwise negative Objective: 
Visit Vitals /80 Pulse 73 Temp 98 °F (36.7 °C) (Oral) Resp 22 Ht 6' 3\" (1.905 m) Wt 276 lb (125.2 kg) SpO2 97% BMI 34.50 kg/m² Body mass index is 34.5 kg/m². Physical Exam:  
General appearance - alert, well appearing, and in no distress Mental status - alert, oriented to person, place, and time EYE-RACHEL, EOMI,conjunctiva normal bilaterally, lids normal 
ENT-ENT exam normal, no neck nodes or sinus tenderness Nose - normal and patent, no erythema,  Or discharge Mouth - mucous membranes moist, pharynx normal without lesions Neck - supple, no significant adenopathy or bruit Chest - clear to auscultation, no wheezes, rales or rhonchi. Heart - normal rate, regular rhythm, normal S1, S2, no murmurs, rubs, clicks or gallops Abdomen - soft, nontender, nondistended, no masses or organomegaly Lymph- no adenopathy palpable Ext-peripheral pulses normal, no pedal edema, no clubbing or cyanosis Skin-Warm and dry. no hyperpigmentation, vitiligo, or suspicious lesions Neuro -alert, oriented, normal speech, no focal findings or movement disorder noted Diabetic foot exam:  
 
Left Foot: 
 Visual Exam: normal  
 Pulse DP: 2+ (normal) Filament test: normal sensation Vibratory sensation: normal 
   
Right Foot: 
 Visual Exam: Multiple hammertoes are present Pulse DP: 2+ (normal) Filament test: normal sensation  Vibratory sensation: normal 
 
 
 
Assessment/Plan: 
Diagnoses and all orders for this visit: 
 
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 
-     HEMOGLOBIN A1C W/O EAG 
-     COLLECTION VENOUS BLOOD,VENIPUNCTURE 
-     URINE, MICROALBUMIN, SEMIQUANTITATIVE Essential hypertension 
-     CBC WITH AUTOMATED DIFF 
-     METABOLIC PANEL, COMPREHENSIVE 
-     URINALYSIS W/MICROSCOPIC Mixed hyperlipidemia 
-     atorvastatin (LIPITOR) 40 mg tablet; Take 1 Tab by mouth daily. , Normal, Disp-30 Tab, R-3 
-     LIPID PANEL 
-     TSH 3RD GENERATION Long-term use of high-risk medication 
-     CK History of cerebellar stroke Obesity (BMI 30-39. 9) Other instructions:  
Patient's medications were reviewed and reconciled. No change in his current medical regimen is made. Body mass index is 34.5 and dietary counseling along with printed patient education was given Consider restart of once daily blood sugar checks Await results of multiple labs Follow-up 6 months Follow-up and Dispositions · Return in about 6 months (around 9/26/2019). I have reviewed with the patient details of the assessment and plan and all questions were answered. Relevent patient education was performed. The most recent lab findings were reviewed with the patient. An After Visit Summary was printed and given to the patient.  
 
Karina Johnson MD

## 2019-03-27 LAB
ALBUMIN SERPL-MCNC: 4.8 G/DL (ref 3.5–5.5)
ALBUMIN/GLOB SERPL: 1.8 {RATIO} (ref 1.2–2.2)
ALP SERPL-CCNC: 104 IU/L (ref 39–117)
ALT SERPL-CCNC: 27 IU/L (ref 0–44)
APPEARANCE UR: CLEAR
AST SERPL-CCNC: 20 IU/L (ref 0–40)
BASOPHILS # BLD AUTO: 0.1 X10E3/UL (ref 0–0.2)
BASOPHILS NFR BLD AUTO: 1 %
BILIRUB SERPL-MCNC: 0.6 MG/DL (ref 0–1.2)
BILIRUB UR QL STRIP: NEGATIVE
BUN SERPL-MCNC: 14 MG/DL (ref 6–24)
BUN/CREAT SERPL: 14 (ref 9–20)
CALCIUM SERPL-MCNC: 9.6 MG/DL (ref 8.7–10.2)
CHLORIDE SERPL-SCNC: 98 MMOL/L (ref 96–106)
CHOLEST SERPL-MCNC: 115 MG/DL (ref 100–199)
CK SERPL-CCNC: 127 U/L (ref 24–204)
CO2 SERPL-SCNC: 26 MMOL/L (ref 20–29)
COLOR UR: YELLOW
CREAT SERPL-MCNC: 1.01 MG/DL (ref 0.76–1.27)
EOSINOPHIL # BLD AUTO: 0.4 X10E3/UL (ref 0–0.4)
EOSINOPHIL NFR BLD AUTO: 4 %
ERYTHROCYTE [DISTWIDTH] IN BLOOD BY AUTOMATED COUNT: 13.7 % (ref 12.3–15.4)
GLOBULIN SER CALC-MCNC: 2.6 G/DL (ref 1.5–4.5)
GLUCOSE SERPL-MCNC: 202 MG/DL (ref 65–99)
GLUCOSE UR QL: ABNORMAL
HBA1C MFR BLD: 8.2 % (ref 4.8–5.6)
HCT VFR BLD AUTO: 43.5 % (ref 37.5–51)
HDLC SERPL-MCNC: 36 MG/DL
HGB BLD-MCNC: 14.9 G/DL (ref 13–17.7)
HGB UR QL STRIP: NEGATIVE
IMM GRANULOCYTES # BLD AUTO: 0.1 X10E3/UL (ref 0–0.1)
IMM GRANULOCYTES NFR BLD AUTO: 1 %
KETONES UR QL STRIP: ABNORMAL
LDLC SERPL CALC-MCNC: 59 MG/DL (ref 0–99)
LEUKOCYTE ESTERASE UR QL STRIP: NEGATIVE
LYMPHOCYTES # BLD AUTO: 1.8 X10E3/UL (ref 0.7–3.1)
LYMPHOCYTES NFR BLD AUTO: 18 %
MCH RBC QN AUTO: 30.1 PG (ref 26.6–33)
MCHC RBC AUTO-ENTMCNC: 34.3 G/DL (ref 31.5–35.7)
MCV RBC AUTO: 88 FL (ref 79–97)
MICRO URNS: ABNORMAL
MICROALBUMIN UR-MCNC: 10.2 UG/ML
MONOCYTES # BLD AUTO: 0.8 X10E3/UL (ref 0.1–0.9)
MONOCYTES NFR BLD AUTO: 8 %
NEUTROPHILS # BLD AUTO: 7.1 X10E3/UL (ref 1.4–7)
NEUTROPHILS NFR BLD AUTO: 68 %
NITRITE UR QL STRIP: NEGATIVE
PH UR STRIP: 6.5 [PH] (ref 5–7.5)
PLATELET # BLD AUTO: 242 X10E3/UL (ref 150–379)
POTASSIUM SERPL-SCNC: 5.1 MMOL/L (ref 3.5–5.2)
PROT SERPL-MCNC: 7.4 G/DL (ref 6–8.5)
PROT UR QL STRIP: ABNORMAL
RBC # BLD AUTO: 4.95 X10E6/UL (ref 4.14–5.8)
SODIUM SERPL-SCNC: 143 MMOL/L (ref 134–144)
SP GR UR: 1.03 (ref 1–1.03)
TRIGL SERPL-MCNC: 100 MG/DL (ref 0–149)
TSH SERPL DL<=0.005 MIU/L-ACNC: 1.81 UIU/ML (ref 0.45–4.5)
UROBILINOGEN UR STRIP-MCNC: 2 MG/DL (ref 0.2–1)
VLDLC SERPL CALC-MCNC: 20 MG/DL (ref 5–40)
WBC # BLD AUTO: 10.3 X10E3/UL (ref 3.4–10.8)

## 2019-03-28 RX ORDER — GLIPIZIDE 10 MG/1
10 TABLET, FILM COATED, EXTENDED RELEASE ORAL 2 TIMES DAILY
Qty: 60 TAB | Refills: 3 | Status: SHIPPED | OUTPATIENT
Start: 2019-03-28 | End: 2019-07-17 | Stop reason: SDUPTHER

## 2019-03-28 NOTE — PROGRESS NOTES
Labs stable except  and A1c high at 8.2 Increase glipizide to BID; continue same metformin Recheck FBS, A1c in 3 months

## 2019-03-28 NOTE — TELEPHONE ENCOUNTER
----- Message from Breanna Yousif MD sent at 3/28/2019  6:28 AM EDT -----  Labs stable except  and A1c high at 8.2  Increase glipizide to BID; continue same metformin  Recheck FBS, A1c in 3 months

## 2019-03-28 NOTE — TELEPHONE ENCOUNTER
Requested Prescriptions     Pending Prescriptions Disp Refills    glipiZIDE SR (GLUCOTROL XL) 10 mg CR tablet 60 Tab 3     Sig: Take 1 Tab by mouth two (2) times a day.

## 2019-07-01 ENCOUNTER — LAB ONLY (OUTPATIENT)
Dept: INTERNAL MEDICINE CLINIC | Age: 54
End: 2019-07-01

## 2019-07-01 DIAGNOSIS — E11.3299 TYPE 2 DIABETES MELLITUS WITH MILD NONPROLIFERATIVE RETINOPATHY, WITHOUT LONG-TERM CURRENT USE OF INSULIN, MACULAR EDEMA PRESENCE UNSPECIFIED, UNSPECIFIED LATERALITY (HCC): Primary | Chronic | ICD-10-CM

## 2019-07-02 LAB
EST. AVERAGE GLUCOSE BLD GHB EST-MCNC: 217 MG/DL
GLUCOSE SERPL-MCNC: 114 MG/DL (ref 65–99)
HBA1C MFR BLD: 9.2 % (ref 4.8–5.6)

## 2019-07-05 ENCOUNTER — TELEPHONE (OUTPATIENT)
Dept: INTERNAL MEDICINE CLINIC | Age: 54
End: 2019-07-05

## 2019-07-05 NOTE — TELEPHONE ENCOUNTER
PCP: Zahra Costa MD    Last appt: 3/26/2019  Future Appointments   Date Time Provider Brian Bronson   10/1/2019  9:00 AM Guadalupe Ibarra MD [address]       Requested Prescriptions      No prescriptions requested or ordered in this encounter       Prior labs and Blood pressures:  BP Readings from Last 3 Encounters:   03/26/19 120/80   01/31/18 142/71   05/18/12 134/84     Lab Results   Component Value Date/Time    Sodium 143 03/26/2019 02:06 PM    Potassium 5.1 03/26/2019 02:06 PM    Chloride 98 03/26/2019 02:06 PM    CO2 26 03/26/2019 02:06 PM    Anion gap 10 01/31/2018 01:51 PM    Glucose 114 (H) 07/01/2019 09:48 AM    BUN 14 03/26/2019 02:06 PM    Creatinine 1.01 03/26/2019 02:06 PM    BUN/Creatinine ratio 14 03/26/2019 02:06 PM    GFR est AA 97 03/26/2019 02:06 PM    GFR est non-AA 84 03/26/2019 02:06 PM    Calcium 9.6 03/26/2019 02:06 PM     Lab Results   Component Value Date/Time    Hemoglobin A1c 9.2 (H) 07/01/2019 09:48 AM     Lab Results   Component Value Date/Time    Cholesterol, total 115 03/26/2019 02:06 PM    HDL Cholesterol 36 (L) 03/26/2019 02:06 PM    LDL, calculated 59 03/26/2019 02:06 PM    VLDL, calculated 20 03/26/2019 02:06 PM    Triglyceride 100 03/26/2019 02:06 PM     No results found for: BRYSON Mondragon    Lab Results   Component Value Date/Time    TSH 1.810 03/26/2019 02:06 PM

## 2019-07-05 NOTE — TELEPHONE ENCOUNTER
Notes recorded by Paz Larsen LPN on 2062 at 0:39 PM EDT  Verified two paint identifiers, name and . Patient provided with lab results. No questions at this time  jardiance sent to dr Joey Morgan  ------    Notes recorded by Paz Larsen LPN on 7311 at 0:16 AM EDT  Left voicemail for patient on number listed in the chart to return call to the office.   ------    Notes recorded by Nneka Reyna MD on 2019 at 6:52 AM EDT   but A1c worse at 9.2  Add Jardiance 10 mg daily  FU as scheduled

## 2019-07-17 DIAGNOSIS — E78.2 MIXED HYPERLIPIDEMIA: Chronic | ICD-10-CM

## 2019-07-17 RX ORDER — ATORVASTATIN CALCIUM 40 MG/1
40 TABLET, FILM COATED ORAL DAILY
Qty: 30 TAB | Refills: 3 | Status: SHIPPED | OUTPATIENT
Start: 2019-07-17 | End: 2020-04-21

## 2019-07-17 RX ORDER — GLIPIZIDE 10 MG/1
10 TABLET, FILM COATED, EXTENDED RELEASE ORAL 2 TIMES DAILY
Qty: 60 TAB | Refills: 3 | Status: SHIPPED | OUTPATIENT
Start: 2019-07-17 | End: 2020-03-18

## 2019-07-17 RX ORDER — METFORMIN HYDROCHLORIDE 500 MG/1
1000 TABLET ORAL 2 TIMES DAILY WITH MEALS
Qty: 120 TAB | Refills: 3 | Status: SHIPPED | OUTPATIENT
Start: 2019-07-17 | End: 2020-02-28

## 2019-07-17 RX ORDER — PANTOPRAZOLE SODIUM 40 MG/1
40 TABLET, DELAYED RELEASE ORAL 2 TIMES DAILY
Qty: 60 TAB | Refills: 3 | Status: SHIPPED | OUTPATIENT
Start: 2019-07-17 | End: 2019-11-27 | Stop reason: SDUPTHER

## 2019-07-17 NOTE — TELEPHONE ENCOUNTER
Last Refill: 3/26/2019  Last visit:7/1/2019    Requested Prescriptions     Pending Prescriptions Disp Refills    atorvastatin (LIPITOR) 40 mg tablet 30 Tab 3     Sig: Take 1 Tab by mouth daily.  glipiZIDE SR (GLUCOTROL XL) 10 mg CR tablet 60 Tab 3     Sig: Take 1 Tab by mouth two (2) times a day.  metFORMIN (GLUCOPHAGE) 500 mg tablet 120 Tab 3     Sig: Take 2 Tabs by mouth two (2) times daily (with meals).  pantoprazole (PROTONIX) 40 mg tablet 60 Tab 3     Sig: Take 1 Tab by mouth two (2) times a day.

## 2019-10-01 ENCOUNTER — OFFICE VISIT (OUTPATIENT)
Dept: INTERNAL MEDICINE CLINIC | Age: 54
End: 2019-10-01

## 2019-10-01 VITALS
DIASTOLIC BLOOD PRESSURE: 80 MMHG | TEMPERATURE: 97.9 F | RESPIRATION RATE: 22 BRPM | HEART RATE: 71 BPM | OXYGEN SATURATION: 98 % | SYSTOLIC BLOOD PRESSURE: 120 MMHG | BODY MASS INDEX: 32.8 KG/M2 | WEIGHT: 263.8 LBS | HEIGHT: 75 IN

## 2019-10-01 DIAGNOSIS — E11.3299 TYPE 2 DIABETES MELLITUS WITH MILD NONPROLIFERATIVE RETINOPATHY, WITHOUT LONG-TERM CURRENT USE OF INSULIN, MACULAR EDEMA PRESENCE UNSPECIFIED, UNSPECIFIED LATERALITY (HCC): Primary | Chronic | ICD-10-CM

## 2019-10-01 DIAGNOSIS — E66.9 OBESITY (BMI 30-39.9): Chronic | ICD-10-CM

## 2019-10-01 DIAGNOSIS — Z12.5 PROSTATE CANCER SCREENING: ICD-10-CM

## 2019-10-01 DIAGNOSIS — Z12.11 COLON CANCER SCREENING: ICD-10-CM

## 2019-10-01 DIAGNOSIS — Z86.73 HISTORY OF CEREBELLAR STROKE: Chronic | ICD-10-CM

## 2019-10-01 DIAGNOSIS — E78.2 MIXED HYPERLIPIDEMIA: Chronic | ICD-10-CM

## 2019-10-01 DIAGNOSIS — Z79.899 LONG-TERM USE OF HIGH-RISK MEDICATION: ICD-10-CM

## 2019-10-01 DIAGNOSIS — I10 ESSENTIAL HYPERTENSION: Chronic | ICD-10-CM

## 2019-10-01 LAB
A-G RATIO,AGRAT: 1.6 RATIO
ALBUMIN SERPL-MCNC: 4.5 G/DL (ref 3.9–5.4)
ALP SERPL-CCNC: 88 U/L (ref 38–126)
ALT SERPL-CCNC: 26 U/L (ref 0–50)
ANION GAP SERPL CALC-SCNC: 14 MMOL/L
AST SERPL W P-5'-P-CCNC: 25 U/L (ref 14–36)
BACTERIA,BACTU: ABNORMAL
BILIRUB SERPL-MCNC: 0.5 MG/DL (ref 0.2–1.3)
BILIRUB UR QL: NEGATIVE
BUN SERPL-MCNC: 17 MG/DL (ref 9–20)
BUN/CREATININE RATIO,BUCR: 19 RATIO
CALCIUM SERPL-MCNC: 9.4 MG/DL (ref 8.4–10.2)
CHLORIDE SERPL-SCNC: 102 MMOL/L (ref 98–107)
CHOL/HDL RATIO,CHHD: 4 RATIO (ref 0–4)
CHOLEST SERPL-MCNC: 113 MG/DL (ref 0–200)
CK SERPL-CCNC: 92 U/L (ref 30–135)
CLARITY: CLEAR
CO2 SERPL-SCNC: 25 MMOL/L (ref 22–32)
COLOR UR: ABNORMAL
CREAT SERPL-MCNC: 0.9 MG/DL (ref 0.8–1.5)
GLOBULIN,GLOB: 2.8
GLUCOSE 24H UR-MRATE: ABNORMAL G/(24.H)
GLUCOSE SERPL-MCNC: 119 MG/DL (ref 75–110)
HDLC SERPL-MCNC: 32 MG/DL (ref 35–130)
HGB UR QL STRIP: NEGATIVE
KETONES UR QL STRIP.AUTO: NEGATIVE
LDL/HDL RATIO,LDHD: 1 RATIO
LDLC SERPL CALC-MCNC: 48 MG/DL (ref 0–130)
LEUKOCYTE ESTERASE: NEGATIVE
MICROALBUMIN, URINE: 20 MG/L (ref 0–20)
NITRITE UR QL STRIP.AUTO: NEGATIVE
PH UR STRIP: 6 [PH] (ref 5–7)
POTASSIUM SERPL-SCNC: 3.9 MMOL/L (ref 3.6–5)
PROT SERPL-MCNC: 7.3 G/DL (ref 6.3–8.2)
PROT UR STRIP-MCNC: NEGATIVE MG/DL
PSA, TEST22: 0.2 NG/ML (ref 0–4)
RBC #/AREA URNS HPF: 0 #/HPF
SODIUM SERPL-SCNC: 141 MMOL/L (ref 137–145)
SP GR UR REFRACTOMETRY: 1.02 (ref 1–1.03)
TRIGL SERPL-MCNC: 167 MG/DL (ref 0–200)
TSH SERPL DL<=0.05 MIU/L-ACNC: 2.59 UIU/ML (ref 0.34–5.6)
UROBILINOGEN UR QL STRIP.AUTO: NEGATIVE
VLDLC SERPL CALC-MCNC: 33 MG/DL
WBC URNS QL MICRO: ABNORMAL #/HPF

## 2019-10-01 NOTE — PROGRESS NOTES
This note will not be viewable in [address] Anoop Harmon is a 47 y.o. male and presents with Diabetes (6 mo fu)  . Subjective:  Mr. Yong Judd returns to the office today in follow-up of multiple medical problems. Patient has type 2 diabetes mellitus for which she is on twice a day glipizide metformin and recently started on Jardiance. He has not been checking his blood sugars. He is followed by ophthalmology due to the presence of diabetic retinopathy. He denies any hypoglycemia and has had no peripheral numbness tingling or burning    Blood pressure is currently managed off of medication. He tries to restrict his salt intake. He has had no headaches, numbness, tingling or focal neurological problems. He has a hyperlipidemia currently on Lipitor therapy. He tolerates this without muscle soreness or GI upset. He has no history of coronary artery disease. He is status post cerebellar stroke. He does take aspirin prophylactically. He has had no recurrent symptoms. Past Medical History:   Diagnosis Date    Diabetes [facility])     Essential hypertension     Hyperlipidemia     Long-term use of high-risk medication 3/26/2019    Stroke [facility]) 01/2018     History reviewed. No pertinent surgical history. No Known Allergies  Current Outpatient Medications   Medication Sig Dispense Refill    atorvastatin (LIPITOR) 40 mg tablet Take 1 Tab by mouth daily. 30 Tab 3    glipiZIDE SR (GLUCOTROL XL) 10 mg CR tablet Take 1 Tab by mouth two (2) times a day. 60 Tab 3    metFORMIN (GLUCOPHAGE) 500 mg tablet Take 2 Tabs by mouth two (2) times daily (with meals). 120 Tab 3    pantoprazole (PROTONIX) 40 mg tablet Take 1 Tab by mouth two (2) times a day. 60 Tab 3    empagliflozin (JARDIANCE) 10 mg tablet Take 1 Tab by mouth daily. 30 Tab 6    aspirin delayed-release 81 mg tablet Take  by mouth daily.        Social History     Socioeconomic History    Marital status: SINGLE     Spouse name: Not on file    Number of children: Not on file    Years of education: Not on file    Highest education level: Not on file   Occupational History    Occupation: [employer]   Tobacco Use    Smoking status: Never Smoker    Smokeless tobacco: Current User     Types: Snuff    Tobacco comment: snuff   Substance and Sexual Activity    Alcohol use: No    Drug use: No     Family History   Problem Relation Age of Onset    Coronary Artery Disease Maternal Grandmother     Coronary Artery Disease Paternal Grandmother     Cancer Mother         vulvar    Diabetes Father        Health Maintenance   Topic Date Due    Pneumococcal 0-64 years (1 of 1 - PPSV23) 02/25/1971    DTaP/Tdap/Td series (1 - Tdap) 02/25/1986    Shingrix Vaccine Age 50> (1 of 2) 02/25/2015    FOBT Q 1 YEAR AGE 50-75  02/25/2015    Influenza Age 9 to Adult  08/01/2020 (Originally 8/1/2019)    HEMOGLOBIN A1C Q6M  01/01/2020    FOOT EXAM Q1  03/26/2020    MICROALBUMIN Q1  03/26/2020    LIPID PANEL Q1  03/26/2020    EYE EXAM RETINAL OR DILATED  05/10/2021    Hepatitis C Screening  Completed        Review of Systems  Constitutional: negative for fevers, chills, anorexia and weight loss  Eyes:   negative for visual disturbance and irritation  ENT:   negative for tinnitus,sore throat,nasal congestion,ear pain,hoarseness  Respiratory:  negative for cough, hemoptysis, dyspnea,wheezing  CV:   negative for chest pain, palpitations, lower extremity edema  GI:   negative for nausea, vomiting, diarrhea, abdominal pain,melena  Endo:               negative for polyuria,polydipsia,polyphagia,heat intolerance  Genitourinary: negative for frequency, dysuria and hematuria  Integumentary: negative for rash and pruritus  Hematologic:  negative for easy bruising and gum/nose bleeding  Musculoskel: negative for myalgias, arthralgias, back pain, muscle weakness, joint pain  Neurological:  negative for headaches, dizziness, vertigo, memory problems and gait   Behavl/Psych: negative for feelings of anxiety, depression, mood changes  ROS otherwise negative      Objective:  Visit Vitals  /80 (BP 1 Location: Right arm, BP Patient Position: Sitting)   Pulse 71   Temp 97.9 °F (36.6 °C) (Oral)   Resp 22   Ht 6' 3\" (1.905 m)   Wt 263 lb 12.8 oz (119.7 kg)   SpO2 98%   BMI 32.97 kg/m²     Body mass index is 32.97 kg/m². Physical Exam:   General appearance - alert, well appearing, and in no distress  Mental status - alert, oriented to person, place, and time  EYE-RACHEL, EOMI,conjunctiva normal bilaterally, lids normal  ENT-ENT exam normal, no neck nodes or sinus tenderness  Nose - normal and patent, no erythema,  Or discharge   Mouth - mucous membranes moist, pharynx normal without lesions  Neck - supple, no significant adenopathy or bruit  Chest - clear to auscultation, no wheezes, rales or rhonchi. Heart - normal rate, regular rhythm, normal S1, S2, no murmurs, rubs, clicks or gallops   Abdomen - soft, nontender, nondistended, no masses or organomegaly  Lymph- no adenopathy palpable  Ext-peripheral pulses normal, no pedal edema, no clubbing or cyanosis  Skin-Warm and dry. no hyperpigmentation, vitiligo, or suspicious lesions  Neuro -alert, oriented, normal speech, no focal findings or movement disorder noted      Assessment/Plan:  Diagnoses and all orders for this visit:    Type 2 diabetes mellitus with mild nonproliferative retinopathy, without long-term current use of insulin, macular edema presence unspecified, unspecified laterality (HCC)  -     HEMOGLOBIN A1C W/O EAG  -     URINE, MICROALBUMIN, SEMIQUANTITATIVE    Essential hypertension  -     CBC WITH AUTOMATED DIFF  -     COLLECTION VENOUS BLOOD,VENIPUNCTURE  -     METABOLIC PANEL, COMPREHENSIVE  -     URINALYSIS W/MICROSCOPIC    Mixed hyperlipidemia  -     LIPID PANEL  -     TSH 3RD GENERATION    Long-term use of high-risk medication  -     CK    Obesity (BMI 30-39. 9)    History of cerebellar stroke    Prostate cancer screening  - PSA, DIAGNOSTIC (PROSTATE SPECIFIC AG)    Colon cancer screening  -     REFERRAL TO GENERAL SURGERY        Other instructions: The patient's medications were reviewed and reconciled. No change in his current medical regimen is made. Body mass index is 33 and dietary counseling along with printed patient education is given    Have requested patient start checking his blood sugars once daily. Continued follow-up by ophthalmology in regards to his retinopathy. Influenza and Pneumovax is declined. Await results of multiple labs. PSA testing will be obtained and he is due for colorectal screening and we will refer to colorectal surgeon for screening purposes. Follow-up in 6 months    Follow-up and Dispositions    · Return in about 6 months (around 4/1/2020). I have reviewed with the patient details of the assessment and plan and all questions were answered. Relevent patient education was performed. The most recent lab findings were reviewed with the patient. An After Visit Summary was printed and given to the patient.     Carol Ann Choudhary MD

## 2019-10-01 NOTE — PROGRESS NOTES
Gisell Rahman is a 47 y.o. male presenting for Diabetes (6 mo fu)  . 1. Have you been to the ER, urgent care clinic since your last visit? Hospitalized since your last visit? No    2. Have you seen or consulted any other health care providers outside of the [address] [place] [address] since your last visit? Include any pap smears or colon screening. No    No flowsheet data found. No flowsheet data found. 3 most recent PHQ Screens 3/26/2019   Little interest or pleasure in doing things Not at all   Feeling down, depressed, irritable, or hopeless Not at all   Total Score PHQ 2 0       There are no discontinued medications.

## 2019-10-01 NOTE — PATIENT INSTRUCTIONS

## 2019-10-02 LAB
BASOPHILS # BLD AUTO: 0.1 X10E3/UL (ref 0–0.2)
BASOPHILS NFR BLD AUTO: 1 %
EOSINOPHIL # BLD AUTO: 0.5 X10E3/UL (ref 0–0.4)
EOSINOPHIL NFR BLD AUTO: 5 %
ERYTHROCYTE [DISTWIDTH] IN BLOOD BY AUTOMATED COUNT: 12.9 % (ref 12.3–15.4)
HBA1C MFR BLD HPLC: 6.6 % (ref 4–5.7)
HCT VFR BLD AUTO: 44.6 % (ref 37.5–51)
HGB BLD-MCNC: 15.4 G/DL (ref 13–17.7)
IMM GRANULOCYTES # BLD AUTO: 0.1 X10E3/UL (ref 0–0.1)
IMM GRANULOCYTES NFR BLD AUTO: 1 %
LYMPHOCYTES # BLD AUTO: 2.2 X10E3/UL (ref 0.7–3.1)
LYMPHOCYTES NFR BLD AUTO: 22 %
MCH RBC QN AUTO: 29.6 PG (ref 26.6–33)
MCHC RBC AUTO-ENTMCNC: 34.5 G/DL (ref 31.5–35.7)
MCV RBC AUTO: 86 FL (ref 79–97)
MONOCYTES # BLD AUTO: 0.7 X10E3/UL (ref 0.1–0.9)
MONOCYTES NFR BLD AUTO: 7 %
NEUTROPHILS # BLD AUTO: 6.1 X10E3/UL (ref 1.4–7)
NEUTROPHILS NFR BLD AUTO: 64 %
PLATELET # BLD AUTO: 218 X10E3/UL (ref 150–450)
RBC # BLD AUTO: 5.2 X10E6/UL (ref 4.14–5.8)
WBC # BLD AUTO: 9.6 X10E3/UL (ref 3.4–10.8)

## 2019-10-02 NOTE — PROGRESS NOTES
A1c is much improved at 6.6 and would continue current regimen.   If he can lose weight over time we may be able to reduce the amount of medication that he is taking  Follow-up as scheduled

## 2019-11-07 NOTE — PERIOP NOTES
[facility]  Ambulatory Surgery Unit  Pre-operative Instructions for Endo Procedures    Procedure Date  11/13/19            Tentative Arrival Time 0715      1. On the day of your procedure, please report to the Ambulatory Surgery Unit Registration Desk and sign in at your designated time. The Ambulatory Surgery Unit is located in [facility] on the [facility] side of the [facility] across from the [address] [place] [address]. Please have all of your health insurance cards and a photo ID. 2. You must have someone with you to drive you home, as you should not drive a car for 24 hours following anesthesia. Please make arrangements for a responsible adult friend or family member to stay with you for at least the first 24 hours after your procedure. 3. Do not have anything to eat or drink (including water, gum, mints, coffee, juice) after 11:59 PM  This may not apply to medications prescribed by your physician. (Please note below the special instructions with medications to take the morning of your procedure.)    4. If applicable, follow the clear liquid diet and bowel prep instructions provided by your physician's office. If you do not have this information, or have any questions, please contact your physician's office. 5. We recommend you do not drink any alcoholic beverages for 24 hours before and after your procedure. 6. Contact your surgeons office for instructions on the following medications: non-steroidal anti-inflammatory drugs (i.e. Advil, Aleve), vitamins, and supplements. (Some surgeons will want you to stop these medications prior to surgery and others may allow you to take them)   **If you are currently taking Plavix, Coumadin, Aspirin and/or other blood-thinning agents, contact your surgeon for instructions. ** Your surgeon will partner with the physician prescribing these medications to determine if it is safe to stop or if you need to continue taking.  Please do not stop taking these medications without instructions from your surgeon. 7. In an effort to help prevent surgical site infection, we ask that you shower with an anti-bacterial soap (i.e. Dial or Safeguard) on the morning of your procedure. Do not apply any lotions, powders, or deodorants after showering. 8. Wear comfortable clothes. Wear glasses instead of contacts. Do not bring any jewelry or money (other than copays or fees as instructed). Do not wear make-up, particularly mascara, the morning of your procedure. Wear your hair loose or down, no ponytails, buns, camden pins or clips. All body piercings must be removed. 9. You should understand that if you do not follow these instructions your procedure may be cancelled. If your physical condition changes (i.e. fever, cold or flu) please contact your surgeon as soon as possible. 10. It is important that you be on time. If a situation occurs where you may be late, or if you have any questions or problems, please call [phone]. 11. Your procedure time may be subject to change. You will receive a phone call the day prior to confirm your arrival time. Special Instructions:none    Take all medications and inhalers, as prescribed, on the morning of surgery with a sip of water EXCEPT: no diabetic meds      Insulin Dependent Diabetic patients: Take your diabetic medications as prescribed the day before surgery. Hold all diabetic medications the day of surgery. If you are scheduled to arrive for surgery after 8:00 AM, and your AM blood sugar is >200, please call Ambulatory Surgery. I understand a pre-operative phone call will be made to verify my procedure time. In the event that I am not available, I give permission for a message to be left on my answering service and/or with another person?       {yes @ [phone].         ___________________      ___________________      ___________________  (Signature of Patient)          (Witness) (Date and Time)

## 2019-11-12 ENCOUNTER — ANESTHESIA EVENT (OUTPATIENT)
Dept: SURGERY | Age: 54
End: 2019-11-12
Payer: MEDICAID

## 2019-11-13 ENCOUNTER — HOSPITAL ENCOUNTER (OUTPATIENT)
Age: 54
Setting detail: OUTPATIENT SURGERY
Discharge: HOME | End: 2019-11-13
Attending: SURGERY | Admitting: SURGERY
Payer: MEDICAID

## 2019-11-13 ENCOUNTER — ANESTHESIA (OUTPATIENT)
Dept: SURGERY | Age: 54
End: 2019-11-13
Payer: MEDICAID

## 2019-11-13 VITALS
OXYGEN SATURATION: 99 % | HEIGHT: 75 IN | DIASTOLIC BLOOD PRESSURE: 80 MMHG | WEIGHT: 262 LBS | BODY MASS INDEX: 32.58 KG/M2 | TEMPERATURE: 97.6 F | RESPIRATION RATE: 11 BRPM | HEART RATE: 62 BPM | SYSTOLIC BLOOD PRESSURE: 133 MMHG

## 2019-11-13 LAB
GLUCOSE BLD STRIP.AUTO-MCNC: 116 MG/DL (ref 65–100)
GLUCOSE BLD STRIP.AUTO-MCNC: 127 MG/DL (ref 65–100)
SERVICE CMNT-IMP: ABNORMAL
SERVICE CMNT-IMP: ABNORMAL

## 2019-11-13 PROCEDURE — 77030020255 HC SOL INJ LR 1000ML BG: Performed by: SURGERY

## 2019-11-13 PROCEDURE — 82962 GLUCOSE BLOOD TEST: CPT

## 2019-11-13 PROCEDURE — 74011250636 HC RX REV CODE- 250/636: Performed by: REGISTERED NURSE

## 2019-11-13 PROCEDURE — 76210000046 HC AMBSU PH II REC FIRST 0.5 HR: Performed by: SURGERY

## 2019-11-13 PROCEDURE — 74011250636 HC RX REV CODE- 250/636: Performed by: ANESTHESIOLOGY

## 2019-11-13 PROCEDURE — 77030019988 HC FCPS ENDOSC DISP BSC -B: Performed by: SURGERY

## 2019-11-13 PROCEDURE — 76030000000 HC AMB SURG OR TIME 0.5 TO 1: Performed by: SURGERY

## 2019-11-13 PROCEDURE — 77030021352 HC CBL LD SYS DISP COVD -B: Performed by: SURGERY

## 2019-11-13 PROCEDURE — 76060000061 HC AMB SURG ANES 0.5 TO 1 HR: Performed by: SURGERY

## 2019-11-13 PROCEDURE — 74011000250 HC RX REV CODE- 250: Performed by: REGISTERED NURSE

## 2019-11-13 PROCEDURE — 76210000040 HC AMBSU PH I REC FIRST 0.5 HR: Performed by: SURGERY

## 2019-11-13 PROCEDURE — 88305 TISSUE EXAM BY PATHOLOGIST: CPT

## 2019-11-13 RX ORDER — LIDOCAINE HYDROCHLORIDE 10 MG/ML
0.1 INJECTION, SOLUTION EPIDURAL; INFILTRATION; INTRACAUDAL; PERINEURAL AS NEEDED
Status: DISCONTINUED | OUTPATIENT
Start: 2019-11-13 | End: 2019-11-13 | Stop reason: HOSPADM

## 2019-11-13 RX ORDER — FENTANYL CITRATE 50 UG/ML
25 INJECTION, SOLUTION INTRAMUSCULAR; INTRAVENOUS
Status: DISCONTINUED | OUTPATIENT
Start: 2019-11-13 | End: 2019-11-13 | Stop reason: HOSPADM

## 2019-11-13 RX ORDER — SODIUM CHLORIDE 0.9 % (FLUSH) 0.9 %
5-40 SYRINGE (ML) INJECTION AS NEEDED
Status: DISCONTINUED | OUTPATIENT
Start: 2019-11-13 | End: 2019-11-13 | Stop reason: HOSPADM

## 2019-11-13 RX ORDER — PHENYLEPHRINE HCL IN 0.9% NACL 0.4MG/10ML
SYRINGE (ML) INTRAVENOUS AS NEEDED
Status: DISCONTINUED | OUTPATIENT
Start: 2019-11-13 | End: 2019-11-13 | Stop reason: HOSPADM

## 2019-11-13 RX ORDER — LIDOCAINE HYDROCHLORIDE 20 MG/ML
INJECTION, SOLUTION EPIDURAL; INFILTRATION; INTRACAUDAL; PERINEURAL AS NEEDED
Status: DISCONTINUED | OUTPATIENT
Start: 2019-11-13 | End: 2019-11-13 | Stop reason: HOSPADM

## 2019-11-13 RX ORDER — ONDANSETRON 2 MG/ML
4 INJECTION INTRAMUSCULAR; INTRAVENOUS AS NEEDED
Status: DISCONTINUED | OUTPATIENT
Start: 2019-11-13 | End: 2019-11-13 | Stop reason: HOSPADM

## 2019-11-13 RX ORDER — SODIUM CHLORIDE 0.9 % (FLUSH) 0.9 %
5-40 SYRINGE (ML) INJECTION EVERY 8 HOURS
Status: DISCONTINUED | OUTPATIENT
Start: 2019-11-13 | End: 2019-11-13 | Stop reason: HOSPADM

## 2019-11-13 RX ORDER — PROPOFOL 10 MG/ML
INJECTION, EMULSION INTRAVENOUS AS NEEDED
Status: DISCONTINUED | OUTPATIENT
Start: 2019-11-13 | End: 2019-11-13 | Stop reason: HOSPADM

## 2019-11-13 RX ORDER — SODIUM CHLORIDE, SODIUM LACTATE, POTASSIUM CHLORIDE, CALCIUM CHLORIDE 600; 310; 30; 20 MG/100ML; MG/100ML; MG/100ML; MG/100ML
25 INJECTION, SOLUTION INTRAVENOUS CONTINUOUS
Status: DISCONTINUED | OUTPATIENT
Start: 2019-11-13 | End: 2019-11-13 | Stop reason: HOSPADM

## 2019-11-13 RX ORDER — DIPHENHYDRAMINE HYDROCHLORIDE 50 MG/ML
12.5 INJECTION, SOLUTION INTRAMUSCULAR; INTRAVENOUS AS NEEDED
Status: DISCONTINUED | OUTPATIENT
Start: 2019-11-13 | End: 2019-11-13 | Stop reason: HOSPADM

## 2019-11-13 RX ADMIN — PROPOFOL 50 MG: 10 INJECTION, EMULSION INTRAVENOUS at 08:59

## 2019-11-13 RX ADMIN — PROPOFOL 30 MG: 10 INJECTION, EMULSION INTRAVENOUS at 08:44

## 2019-11-13 RX ADMIN — Medication 40 MCG: at 08:47

## 2019-11-13 RX ADMIN — Medication 40 MCG: at 08:53

## 2019-11-13 RX ADMIN — PROPOFOL 50 MG: 10 INJECTION, EMULSION INTRAVENOUS at 08:51

## 2019-11-13 RX ADMIN — PROPOFOL 50 MG: 10 INJECTION, EMULSION INTRAVENOUS at 09:06

## 2019-11-13 RX ADMIN — SODIUM CHLORIDE, SODIUM LACTATE, POTASSIUM CHLORIDE, AND CALCIUM CHLORIDE 25 ML/HR: 600; 310; 30; 20 INJECTION, SOLUTION INTRAVENOUS at 07:57

## 2019-11-13 RX ADMIN — PROPOFOL 50 MG: 10 INJECTION, EMULSION INTRAVENOUS at 09:18

## 2019-11-13 RX ADMIN — PROPOFOL 50 MG: 10 INJECTION, EMULSION INTRAVENOUS at 09:03

## 2019-11-13 RX ADMIN — PROPOFOL 50 MG: 10 INJECTION, EMULSION INTRAVENOUS at 08:42

## 2019-11-13 RX ADMIN — LIDOCAINE HYDROCHLORIDE 40 MG: 20 INJECTION, SOLUTION EPIDURAL; INFILTRATION; INTRACAUDAL; PERINEURAL at 08:40

## 2019-11-13 RX ADMIN — PROPOFOL 50 MG: 10 INJECTION, EMULSION INTRAVENOUS at 08:55

## 2019-11-13 RX ADMIN — Medication 40 MCG: at 08:41

## 2019-11-13 RX ADMIN — Medication 40 MCG: at 09:00

## 2019-11-13 RX ADMIN — PROPOFOL 50 MG: 10 INJECTION, EMULSION INTRAVENOUS at 08:47

## 2019-11-13 RX ADMIN — PROPOFOL 50 MG: 10 INJECTION, EMULSION INTRAVENOUS at 09:09

## 2019-11-13 RX ADMIN — PROPOFOL 70 MG: 10 INJECTION, EMULSION INTRAVENOUS at 08:40

## 2019-11-13 NOTE — ANESTHESIA POSTPROCEDURE EVALUATION
Procedure(s):  COLONOSCOPY  COLON BIOPSY.     general, total IV anesthesia    Anesthesia Post Evaluation      Multimodal analgesia: multimodal analgesia not used between 6 hours prior to anesthesia start to PACU discharge  Patient location during evaluation: PACU  Patient participation: complete - patient participated  Level of consciousness: awake and alert  Pain score: 0  Airway patency: patent  Anesthetic complications: no  Cardiovascular status: acceptable  Respiratory status: acceptable  Hydration status: acceptable  Post anesthesia nausea and vomiting:  none      Vitals Value Taken Time   /72 11/13/2019  9:46 AM   Temp 36.4 °C (97.6 °F) 11/13/2019  9:39 AM   Pulse 67 11/13/2019  9:46 AM   Resp 13 11/13/2019  9:46 AM   SpO2 98 % 11/13/2019  9:46 AM

## 2019-11-13 NOTE — ANESTHESIA PREPROCEDURE EVALUATION
Relevant Problems   No relevant active problems       Anesthetic History   No history of anesthetic complications            Review of Systems / Medical History  Patient summary reviewed, nursing notes reviewed and pertinent labs reviewed    Pulmonary  Within defined limits                 Neuro/Psych       CVA (cerebellar/ balance problem)      Comments: Dizziness with neck extension d/t stroke Cardiovascular    Hypertension          Hyperlipidemia    Exercise tolerance: >4 METS     GI/Hepatic/Renal  Within defined limits              Endo/Other    Diabetes: type 2    Obesity     Other Findings              Physical Exam    Airway  Mallampati: III  TM Distance: 4 - 6 cm  Neck ROM: decreased range of motion   Mouth opening: Normal     Cardiovascular    Rhythm: regular  Rate: normal         Dental  No notable dental hx       Pulmonary  Breath sounds clear to auscultation               Abdominal  GI exam deferred       Other Findings            Anesthetic Plan    ASA: 2  Anesthesia type: general and total IV anesthesia          Induction: Intravenous  Anesthetic plan and risks discussed with: Patient      preop glucose 127

## 2019-11-13 NOTE — PERIOP NOTES
Permission received to review discharge instructions and discuss private health information with mom Josue Justus and brother Nato Villalba

## 2019-11-13 NOTE — PERIOP NOTES
Karelyparamjit Rowe  1965  [id number]    Situation:  Verbal report given from: Maryse Menjivar CRNA  Procedure: Procedure(s):  COLONOSCOPY  COLON BIOPSY    Background:    Preoperative diagnosis: SCREENING, FAMILY HX OF CROHN'S DISEASE    Postoperative diagnosis: SCREENING, FAMILY HX OF CROHN'S DISEASE    :  Dr. Shanice Rhoades    Assistant(s): Circ-1: Garrett Britt RN  Scrub RN-1: Timothy Salazar RN    Specimens:   ID Type Source Tests Collected by Time Destination   1 : Splenic Flexure Biopsy Preservative Splenic Flexure  Ronaldo Hatfield MD 11/13/2019 0932 Pathology       Assessment:  Intra-procedure medications   Propofol 550 mg      Anesthesia gave intra-procedure sedation and medications, see anesthesia flow sheet     Intravenous fluids: LR@ KVO     Vital signs stable     Abdominal assessment: round and soft       Recommendation:    Permission to share finding with mom and brother yes    All side rails up, bed in low position, wheels locked. Nurse at bedside.

## 2019-11-13 NOTE — DISCHARGE INSTRUCTIONS
Nadine Palomo  [id number]  1965    COLON DISCHARGE INSTRUCTIONS  Discomfort:  Redness at IV site- apply warm compress to area; if redness or soreness persist- contact your physician  There may be a slight amount of blood passed from the rectum  Gaseous discomfort- walking, belching will help relieve any discomfort  You may not operate a vehicle for 12 hours  You may not engage in an occupation involving machinery or appliances for rest of today  You may not drink alcoholic beverages for at least 12 hours  Avoid making any critical decisions for at least 24 hour  DIET:   Regular diet. - however -  remember your colon is empty and a heavy meal will produce gas. Avoid these foods:  vegetables, fried / greasy foods, carbonated drinks for today       ACTIVITY:  You may resume your normal daily activities it is recommended that you spend the remainder of the day resting -  avoid any strenuous activity. CALL M.D. ANY SIGN OF:   Increasing pain, nausea, vomiting  Abdominal distension (swelling)  New increased bleeding (oral or rectal)  Fever (chills)  Pain in chest area  Bloody discharge from nose or mouth  Shortness of breath     Follow-up Instructions:   Call Sandip Rincon MD if any questions or problems. Telephone # [phone]  Biopsy results will be available in  7 to10 days  Should have a repeat colonoscopy in 5 years. COLONOSCOPY FINDINGS:  Your colonoscopy showed: 1 small polyp (removed). DO NOT TAKE TYLENOL/ACETAMINOPHEN WITH PERCOCET, [facility]ORT[facility], [address] TAKE NARCOTIC PAIN MEDICATIONS WITH FOOD     Narcotics tend to be constipating, we suggest taking a stool softener such as Colace or Miralax (follow package instructions). DO NOT DRIVE WHILE TAKING NARCOTIC PAIN MEDICATIONS. DO NOT TAKE SLEEPING MEDICATIONS OR ANTIANXIETY MEDICATIONS WHILE TAKING NARCOTIC PAIN MEDICATIONS,  ESPECIALLY THE NIGHT OF ANESTHESIA!     CPAP PATIENTS BE SURE TO WEAR MACHINE WHENEVER NAPPING OR SLEEPING! DISCHARGE SUMMARY from Nurse    The following personal items collected during your admission are returned to you:   Dental Appliance: Dental Appliances: None  Vision: Visual Aid: Glasses, At home(reading glasses at home)  Hearing Aid:    Jewelry:    Clothing:    Other Valuables:    Valuables sent to safe:        PATIENT INSTRUCTIONS:    After General Anesthesia or Intravenous Sedation, for 24 hours or while taking prescription Narcotics:        Someone should be with you for the next 24 hours. For your own safety, a responsible adult must drive you home. · Limit your activities  · Recommended activity: Rest today, up with assistance today. Do not climb stairs or shower unattended for the next 24 hours. · Please start with a soft bland diet and advance as tolerated (no nausea) to regular diet. · If you have a sore throat you should try the following: fluids, warm salt water gargles, or throat lozenges. If it does not improve after several days please follow up with your primary physician. · Do not drive and operate hazardous machinery  · Do not make important personal or business decisions  · Do  not drink alcoholic beverages  · If you have not urinated within 8 hours after discharge, please contact your surgeon on call. Report the following to your surgeon:  · Excessive pain, swelling, redness or odor of or around the surgical area  · Temperature over 100.5  · Nausea and vomiting lasting longer than 4 hours or if unable to take medications  · Any signs of decreased circulation or nerve impairment to extremity: change in color, persistent  numbness, tingling, coldness or increase pain      · You will receive a Post Operative Call from one of the Recovery Room Nurses on the day after your surgery to check on you. It is very important for us to know how you are recovering after your surgery.  If you have an issue or need to speak with someone, please call your surgeon, do not wait for the post operative call. · You may receive an e-mail or letter in the mail from [facility] regarding your experience with us in the Ambulatory Surgery Unit. Your feedback is valuable to us and we appreciate your participation in the survey. · If the above instructions are not adequate or you are having problems after your surgery, call the physician at their office number. · We wish you a speedy recovery ? What to do at Home:      *  Please give a list of your current medications to your Primary Care Provider. *  Please update this list whenever your medications are discontinued, doses are      changed, or new medications (including over-the-counter products) are added. *  Please carry medication information at all times in case of emergency situations. If you have not received your influenza and/or pneumococcal vaccine, please follow up with your primary care physician. The discharge information has been reviewed with the patient and caregiver. The patient and caregiver verbalized understanding.

## 2019-11-13 NOTE — PROCEDURES
Colonoscopy Procedure Note    Indications: Routine screening    Anesthesia/Sedation: MAC anesthesia Propofol    Pre-Procedure Exam:  Airway: clear   Heart: normal S1and S2    Lungs: clear bilateral  Abdomen: soft, nontender, bowel sounds present and normal in all quadrants   Mental Status: awake, alert, and oriented to person, place, and time      Procedure in Detail:  Informed consent was obtained for the procedure, including sedation. Risks of perforation, hemorrhage, adverse drug reaction, and aspiration were discussed. The patient was placed in the left lateral decubitus position. Based on the pre-procedure assessment, including review of the patient's medical history, medications, allergies, and review of systems, he had been deemed to be an appropriate candidate for moderate sedation; he was therefore sedated with the medications listed above. The patient was monitored continuously with ECG tracing, pulse oximetry, blood pressure monitoring, and direct observations. A rectal examination was performed. The [id number] was inserted into the rectum and advanced under direct vision to the cecum with great difficulty due to tortuosity and elongated colon, which was identified by the ileocecal valve and appendiceal orifice. The quality of the colonic preparation was fair. A careful inspection was made as the colonoscope was withdrawn, including a retroflexed view of the rectum; findings and interventions are described below. Appropriate photodocumentation was obtained. Findings:   Rectum:   Normal  Sigmoid:   Normal    - Tortuosity  Descending Colon:   Normal  Transverse Colon:     - Severe tortuosity with elongated colon and large folds. Poor colon distension    - Small polyp at the splenic flexure. Removed and retrieved with hot biopsy forceps  Ascending Colon:   Normal  Cecum:   Normal          Specimens: Specimens were collected and sent to pathology.        EBL: None    Complications: None; patient tolerated the procedure well. Attending Attestation: I performed the procedure. Recommendations:   - Repeat colonoscopy in 5 years.

## 2019-11-13 NOTE — PERIOP NOTES
Discharge instructions reviewed w/pt. And family. They verbalized understanding. Vss. Denies pain. Abdomen soft, nontender. Pt discharged via wheelchair to car, accompanied by RN. Pt discharged awake and alert, respirations equal and unlabored, skin warm, dry, and intact. Pt and family members' questions and concerns addressed prior to discharge.

## 2019-11-13 NOTE — PERIOP NOTES
Patient states that mom and brother will be with them for at least 24 hours following today's procedure.

## 2019-11-13 NOTE — H&P
History and Physical    Patient: Karely Rowe MRN: [id number]  SSN: [id number]    YOB: 1965  Age: 47 y.o. Sex: male      Subjective:      Karely Rowe is a 47 y.o. male who presents for screening colonoscopy. Past Medical History:   Diagnosis Date    Diabetes [facility])     Essential hypertension     Hyperlipidemia     Long-term use of high-risk medication 3/26/2019    Stroke [facility]) 01/2018    balance     History reviewed. No pertinent surgical history. Family History   Problem Relation Age of Onset    Coronary Artery Disease Maternal Grandmother     Coronary Artery Disease Paternal Grandmother    [facility] Cancer Mother         vulvar    Diabetes Father      Social History     Tobacco Use    Smoking status: Never Smoker    Smokeless tobacco: Current User     Types: Snuff    Tobacco comment: snuff   Substance Use Topics    Alcohol use: No      Prior to Admission medications    Medication Sig Start Date End Date Taking? Authorizing Provider   atorvastatin (LIPITOR) 40 mg tablet Take 1 Tab by mouth daily. 7/17/19  Yes Arnoldo Crow MD   glipiZIDE SR (GLUCOTROL XL) 10 mg CR tablet Take 1 Tab by mouth two (2) times a day. 7/17/19  Yes Arnoldo Crow MD   metFORMIN (GLUCOPHAGE) 500 mg tablet Take 2 Tabs by mouth two (2) times daily (with meals). 7/17/19  Yes Arnoldo Crow MD   pantoprazole (PROTONIX) 40 mg tablet Take 1 Tab by mouth two (2) times a day. 7/17/19  Yes Arnoldo Crow MD   empagliflozin (JARDIANCE) 10 mg tablet Take 1 Tab by mouth daily. 7/6/19  Yes Arnoldo Crow MD   aspirin delayed-release 81 mg tablet Take  by mouth daily. Yes Provider, Historical        No Known Allergies    Review of Systems:  A comprehensive review of systems was negative except for that written in the History of Present Illness.     Objective:     Vitals:    11/07/19 1249 11/13/19 0748   BP:  133/78   Pulse:  60   Resp:  18   Temp:  98.6 °F (37 °C)   SpO2: 97%   Weight: 119.3 kg (263 lb) 118.8 kg (262 lb)   Height: 6' 3\" (1.905 m) 6' 3\" (1.905 m)        Physical Exam:  General:  Alert, cooperative, no distress, appears stated age. Eyes:  Conjunctivae/corneas clear. PERRL, EOMs intact. Fundi benign   Ears:  Normal TMs and external ear canals both ears. Nose: Nares normal. Septum midline. Mucosa normal. No drainage or sinus tenderness. Mouth/Throat: Lips, mucosa, and tongue normal. Teeth and gums normal.   Neck: Supple, symmetrical, trachea midline, no adenopathy, thyroid: no enlargment/tenderness/nodules, no carotid bruit and no JVD. Back:   Symmetric, no curvature. ROM normal. No CVA tenderness. Lungs:   Clear to auscultation bilaterally. Heart:  Regular rate and rhythm, S1, S2 normal, no murmur, click, rub or gallop. Abdomen:   Soft, non-tender. Bowel sounds normal. No masses,  No organomegaly. Extremities: Extremities normal, atraumatic, no cyanosis or edema. Pulses: 2+ and symmetric all extremities. Skin: Skin color, texture, turgor normal. No rashes or lesions   Lymph nodes: Cervical, supraclavicular, and axillary nodes normal.   Neurologic: CNII-XII intact. Normal strength, sensation and reflexes throughout. Assessment:     Hospital Problems  Date Reviewed: 11/12/2019    None          Plan:     1st screening colonoscopy.   Fam hx of Crohn's    Signed By: Marci Borges MD     November 13, 2019

## 2019-11-27 RX ORDER — PANTOPRAZOLE SODIUM 40 MG/1
TABLET, DELAYED RELEASE ORAL
Qty: 60 TAB | Refills: 3 | Status: SHIPPED | OUTPATIENT
Start: 2019-11-27 | End: 2020-03-28

## 2020-03-18 RX ORDER — GLIPIZIDE 10 MG/1
TABLET, FILM COATED, EXTENDED RELEASE ORAL
Qty: 60 TAB | Refills: 0 | Status: SHIPPED | OUTPATIENT
Start: 2020-03-18 | End: 2020-04-16

## 2020-03-28 RX ORDER — PANTOPRAZOLE SODIUM 40 MG/1
TABLET, DELAYED RELEASE ORAL
Qty: 60 TAB | Refills: 0 | Status: SHIPPED | OUTPATIENT
Start: 2020-03-28 | End: 2020-04-28

## 2020-04-16 RX ORDER — GLIPIZIDE 10 MG/1
TABLET, FILM COATED, EXTENDED RELEASE ORAL
Qty: 60 TAB | Refills: 0 | Status: SHIPPED | OUTPATIENT
Start: 2020-04-16 | End: 2020-05-16

## 2020-05-16 RX ORDER — GLIPIZIDE 10 MG/1
TABLET, FILM COATED, EXTENDED RELEASE ORAL
Qty: 60 TAB | Refills: 0 | Status: SHIPPED | OUTPATIENT
Start: 2020-05-16 | End: 2020-06-16

## 2020-05-24 ENCOUNTER — HOSPITAL ENCOUNTER (EMERGENCY)
Age: 55
Discharge: HOME | End: 2020-05-24
Attending: EMERGENCY MEDICINE
Payer: MEDICAID

## 2020-05-24 ENCOUNTER — APPOINTMENT (OUTPATIENT)
Dept: GENERAL RADIOLOGY | Age: 55
End: 2020-05-24
Attending: EMERGENCY MEDICINE
Payer: MEDICAID

## 2020-05-24 VITALS
WEIGHT: 259.92 LBS | TEMPERATURE: 98.6 F | SYSTOLIC BLOOD PRESSURE: 121 MMHG | BODY MASS INDEX: 32.32 KG/M2 | OXYGEN SATURATION: 97 % | RESPIRATION RATE: 11 BRPM | HEIGHT: 75 IN | HEART RATE: 72 BPM | DIASTOLIC BLOOD PRESSURE: 75 MMHG

## 2020-05-24 DIAGNOSIS — R07.89 ATYPICAL CHEST PAIN: Primary | ICD-10-CM

## 2020-05-24 LAB
ALBUMIN SERPL-MCNC: 4.2 G/DL (ref 3.5–5)
ALBUMIN/GLOB SERPL: 1.3 {RATIO} (ref 1.1–2.2)
ALP SERPL-CCNC: 81 U/L (ref 45–117)
ALT SERPL-CCNC: 24 U/L (ref 12–78)
ANION GAP SERPL CALC-SCNC: 6 MMOL/L (ref 5–15)
AST SERPL-CCNC: 17 U/L (ref 15–37)
BASOPHILS # BLD: 0.1 K/UL (ref 0–0.1)
BASOPHILS NFR BLD: 1 % (ref 0–1)
BILIRUB SERPL-MCNC: 0.8 MG/DL (ref 0.2–1)
BNP SERPL-MCNC: 46 PG/ML
BUN SERPL-MCNC: 19 MG/DL (ref 6–20)
BUN/CREAT SERPL: 16 (ref 12–20)
CALCIUM SERPL-MCNC: 9.2 MG/DL (ref 8.5–10.1)
CHLORIDE SERPL-SCNC: 104 MMOL/L (ref 97–108)
CO2 SERPL-SCNC: 27 MMOL/L (ref 21–32)
CREAT SERPL-MCNC: 1.16 MG/DL (ref 0.7–1.3)
DIFFERENTIAL METHOD BLD: ABNORMAL
EOSINOPHIL # BLD: 0.4 K/UL (ref 0–0.4)
EOSINOPHIL NFR BLD: 4 % (ref 0–7)
ERYTHROCYTE [DISTWIDTH] IN BLOOD BY AUTOMATED COUNT: 13.1 % (ref 11.5–14.5)
GLOBULIN SER CALC-MCNC: 3.2 G/DL (ref 2–4)
GLUCOSE SERPL-MCNC: 139 MG/DL (ref 65–100)
HCT VFR BLD AUTO: 43.4 % (ref 36.6–50.3)
HGB BLD-MCNC: 15.2 G/DL (ref 12.1–17)
IMM GRANULOCYTES # BLD AUTO: 0 K/UL (ref 0–0.04)
IMM GRANULOCYTES NFR BLD AUTO: 1 % (ref 0–0.5)
LYMPHOCYTES # BLD: 1.6 K/UL (ref 0.8–3.5)
LYMPHOCYTES NFR BLD: 19 % (ref 12–49)
MCH RBC QN AUTO: 30.1 PG (ref 26–34)
MCHC RBC AUTO-ENTMCNC: 35 G/DL (ref 30–36.5)
MCV RBC AUTO: 85.9 FL (ref 80–99)
MONOCYTES # BLD: 0.7 K/UL (ref 0–1)
MONOCYTES NFR BLD: 8 % (ref 5–13)
NEUTS SEG # BLD: 5.7 K/UL (ref 1.8–8)
NEUTS SEG NFR BLD: 67 % (ref 32–75)
NRBC # BLD: 0 K/UL (ref 0–0.01)
NRBC BLD-RTO: 0 PER 100 WBC
PLATELET # BLD AUTO: 211 K/UL (ref 150–400)
PMV BLD AUTO: 9.3 FL (ref 8.9–12.9)
POTASSIUM SERPL-SCNC: 3.6 MMOL/L (ref 3.5–5.1)
PROT SERPL-MCNC: 7.4 G/DL (ref 6.4–8.2)
RBC # BLD AUTO: 5.05 M/UL (ref 4.1–5.7)
SODIUM SERPL-SCNC: 137 MMOL/L (ref 136–145)
TROPONIN I SERPL-MCNC: <0.05 NG/ML
WBC # BLD AUTO: 8.5 K/UL (ref 4.1–11.1)

## 2020-05-24 PROCEDURE — 83880 ASSAY OF NATRIURETIC PEPTIDE: CPT

## 2020-05-24 PROCEDURE — 36415 COLL VENOUS BLD VENIPUNCTURE: CPT

## 2020-05-24 PROCEDURE — 84484 ASSAY OF TROPONIN QUANT: CPT

## 2020-05-24 PROCEDURE — 80053 COMPREHEN METABOLIC PANEL: CPT

## 2020-05-24 PROCEDURE — 71046 X-RAY EXAM CHEST 2 VIEWS: CPT

## 2020-05-24 PROCEDURE — 93005 ELECTROCARDIOGRAM TRACING: CPT

## 2020-05-24 PROCEDURE — 85025 COMPLETE CBC W/AUTO DIFF WBC: CPT

## 2020-05-24 PROCEDURE — 99285 EMERGENCY DEPT VISIT HI MDM: CPT

## 2020-05-24 NOTE — ED NOTES
Bedside and Verbal shift change report given to Emiliano RN (oncoming nurse) by Rob Cohn RN (offgoing nurse). Report included the following information SBAR, ED Summary, MAR and Recent Results.

## 2020-05-24 NOTE — ED NOTES
Pt arrives form home via triage reporting central chest pain that has persisted for 3 weeks but became progressively worse this week. Pt reports being woken several times by the pain last night. Pt endorses mild pain at this time.

## 2020-05-24 NOTE — ED PROVIDER NOTES
EMERGENCY DEPARTMENT HISTORY AND PHYSICAL EXAM      Date: 5/24/2020  Patient Name: James Lockhart  Patient Age and Sex: 54 y.o. male    History of Presenting Illness     Chief Complaint   Patient presents with    Shortness of Breath     Ambulatory into the ED with c/o intermittent CP and SOB x 2-3 weeks. No acute distress noted.  Chest Pain       History Provided By: Patient    Ability to gather history was limited by:     HPI: James Lockhart, 54 y.o. male with history of obesity, diabetes, hyperlipidemia, complains of intermittent chest pain for the last 3 weeks. Currently he has minimal to no symptoms here in the emergency department. His symptoms at times are pressure-like and aching in nature, central chest, nonradiating, without any shortness of breath or nausea or vomiting or fevers. Not worsened by eating or exertion. Has not had a stress test in many years. Does not smoke. Location:    Quality:      Severity:    Duration:   Timing:      Context:    Modifying factors:   Associated symptoms:       The patient's medical, surgical, family, and social history on file were reviewed by me today.       Past Medical History:   Diagnosis Date    Diabetes ([facility] [address]     Essential hypertension     Hyperlipidemia     Long-term use of high-risk medication 3/26/2019    Stroke [facility]) 01/2018    balance     Past Surgical History:   Procedure Laterality Date    COLONOSCOPY N/A 11/13/2019    COLONOSCOPY performed by Baldo Erwin MD at [facility] AMBULATORY OR       PCP: Jagdish Duran MD    Past History     Past Medical History:  Past Medical History:   Diagnosis Date    Diabetes [facility])     Essential hypertension     Hyperlipidemia     Long-term use of high-risk medication 3/26/2019    Stroke ([facility] [address] 01/2018    balance       Past Surgical History:  Past Surgical History:   Procedure Laterality Date    COLONOSCOPY N/A 11/13/2019    COLONOSCOPY performed by Baldo Erwin MD at [facility] AMBULATORY OR Family History:  Family History   Problem Relation Age of Onset    Coronary Artery Disease Maternal Grandmother     Coronary Artery Disease Paternal Grandmother     Cancer Mother         vulvar    Diabetes Father        Social History:  Social History     Tobacco Use    Smoking status: Never Smoker    Smokeless tobacco: Current User     Types: Snuff    Tobacco comment: snuff   Substance Use Topics    Alcohol use: No    Drug use: No       Allergies:  No Known Allergies    Current Medications:  No current facility-administered medications on file prior to encounter. Current Outpatient Medications on File Prior to Encounter   Medication Sig Dispense Refill    glipiZIDE SR (GLUCOTROL XL) 10 mg CR tablet Take 1 tablet by mouth twice daily 60 Tab 0    pantoprazole (PROTONIX) 40 mg tablet Take 1 tablet by mouth twice daily 60 Tab 2    metFORMIN (GLUCOPHAGE) 500 mg tablet TAKE 2 TABLETS BY MOUTH TWICE DAILY WITH MEALS 120 Tab 2    atorvastatin (LIPITOR) 40 mg tablet Take 1 tablet by mouth once daily 30 Tab 3    JARDIANCE 10 mg tablet TAKE 1 TABLET BY MOUTH ONCE DAILY 30 Tab 4    aspirin delayed-release 81 mg tablet Take  by mouth daily. Review of Systems   Review of Systems   Constitutional: Negative for fatigue and fever. Respiratory: Negative for shortness of breath. Cardiovascular: Positive for chest pain. Negative for leg swelling. Gastrointestinal: Negative for abdominal pain and nausea. All other systems reviewed and are negative. Physical Exam   Vital Signs  Patient Vitals for the past 8 hrs:   Temp Pulse Resp BP SpO2   05/24/20 1900    134/65    05/24/20 1845  69 11  95 %   05/24/20 1830  68 (!) 0 136/73 97 %   05/24/20 1824     97 %   05/24/20 1748 98.6 °F (37 °C) 82 11 178/76 100 %          Physical Exam  Vitals signs and nursing note reviewed. Constitutional:       General: He is not in acute distress. Appearance: He is well-developed. He is obese. He is not ill-appearing. HENT:      Head: Normocephalic and atraumatic. Eyes:      General:         Right eye: No discharge. Left eye: No discharge. Conjunctiva/sclera: Conjunctivae normal.   Neck:      Musculoskeletal: Normal range of motion and neck supple. Cardiovascular:      Rate and Rhythm: Normal rate and regular rhythm. Heart sounds: Normal heart sounds. No murmur. Pulmonary:      Effort: Pulmonary effort is normal. No respiratory distress. Breath sounds: Normal breath sounds. No wheezing. Abdominal:      General: There is no distension. Palpations: Abdomen is soft. Tenderness: There is no abdominal tenderness. Musculoskeletal: Normal range of motion. General: No deformity. Skin:     General: Skin is warm and dry. Findings: No rash. Neurological:      Mental Status: He is alert and oriented to person, place, and time. Psychiatric:         Behavior: Behavior normal.         Thought Content: Thought content normal.         Diagnostic Study Results   Labs  Recent Results (from the past 24 hour(s))   EKG, 12 LEAD, INITIAL    Collection Time: 05/24/20  5:55 PM   Result Value Ref Range    Ventricular Rate 78 BPM    Atrial Rate 78 BPM    P-R Interval 178 ms    QRS Duration 114 ms    Q-T Interval 392 ms    QTC Calculation (Bezet) 446 ms    Calculated P Axis 56 degrees    Calculated R Axis -67 degrees    Calculated T Axis 76 degrees    Diagnosis       Normal sinus rhythm  Possible Left atrial enlargement  Left axis deviation  Right bundle branch block     CBC WITH AUTOMATED DIFF    Collection Time: 05/24/20  6:45 PM   Result Value Ref Range    WBC 8.5 4.1 - 11.1 K/uL    RBC 5.05 4. 10 - 5.70 M/uL    HGB 15.2 12.1 - 17.0 g/dL    HCT 43.4 36.6 - 50.3 %    MCV 85.9 80.0 - 99.0 FL    MCH 30.1 26.0 - 34.0 PG    MCHC 35.0 30.0 - 36.5 g/dL    RDW 13.1 11.5 - 14.5 %    PLATELET 943 426 - 182 K/uL    MPV 9.3 8.9 - 12.9 FL    NRBC 0.0 0  WBC    ABSOLUTE NRBC 0. 00 0.00 - 0.01 K/uL    NEUTROPHILS 67 32 - 75 %    LYMPHOCYTES 19 12 - 49 %    MONOCYTES 8 5 - 13 %    EOSINOPHILS 4 0 - 7 %    BASOPHILS 1 0 - 1 %    IMMATURE GRANULOCYTES 1 (H) 0.0 - 0.5 %    ABS. NEUTROPHILS 5.7 1.8 - 8.0 K/UL    ABS. LYMPHOCYTES 1.6 0.8 - 3.5 K/UL    ABS. MONOCYTES 0.7 0.0 - 1.0 K/UL    ABS. EOSINOPHILS 0.4 0.0 - 0.4 K/UL    ABS. BASOPHILS 0.1 0.0 - 0.1 K/UL    ABS. IMM. GRANS. 0.0 0.00 - 0.04 K/UL    DF AUTOMATED     METABOLIC PANEL, COMPREHENSIVE    Collection Time: 05/24/20  6:45 PM   Result Value Ref Range    Sodium 137 136 - 145 mmol/L    Potassium 3.6 3.5 - 5.1 mmol/L    Chloride 104 97 - 108 mmol/L    CO2 27 21 - 32 mmol/L    Anion gap 6 5 - 15 mmol/L    Glucose 139 (H) 65 - 100 mg/dL    BUN 19 6 - 20 MG/DL    Creatinine 1.16 0.70 - 1.30 MG/DL    BUN/Creatinine ratio 16 12 - 20      GFR est AA >60 >60 ml/min/1.73m2    GFR est non-AA >60 >60 ml/min/1.73m2    Calcium 9.2 8.5 - 10.1 MG/DL    Bilirubin, total 0.8 0.2 - 1.0 MG/DL    ALT (SGPT) 24 12 - 78 U/L    AST (SGOT) 17 15 - 37 U/L    Alk. phosphatase 81 45 - 117 U/L    Protein, total 7.4 6.4 - 8.2 g/dL    Albumin 4.2 3.5 - 5.0 g/dL    Globulin 3.2 2.0 - 4.0 g/dL    A-G Ratio 1.3 1.1 - 2.2     TROPONIN I    Collection Time: 05/24/20  6:45 PM   Result Value Ref Range    Troponin-I, Qt. <0.05 <0.05 ng/mL   NT-PRO BNP    Collection Time: 05/24/20  6:45 PM   Result Value Ref Range    NT pro-BNP 46 <125 PG/ML       Radiologic Studies  XR CHEST PA LAT   Final Result   IMPRESSION: No acute cardiopulmonary disease. CT Results  (Last 48 hours)    None        CXR Results  (Last 48 hours)               05/24/20 1900  XR CHEST PA LAT Final result    Impression:  IMPRESSION: No acute cardiopulmonary disease. Narrative: Indication:  3 weeks intermittent chest pain, normal exam        Exam: PA and lateral views of the chest.       Direct comparison is made to prior CXR dated April 2012.        Findings: Cardiomediastinal silhouette is within normal limits. Lungs are clear   bilaterally. Pleural spaces are normal. Osseous structures are intact. Procedures   EKG  Date/Time: 5/24/2020 6:30 PM  Performed by: Monica Hunter MD  Authorized by: Monica Hunter MD     ECG reviewed by ED Physician in the absence of a cardiologist: yes    Interpretation:     Interpretation: non-specific    Rate:     ECG rate assessment: normal    Rhythm:     Rhythm: sinus rhythm    Ectopy:     Ectopy: none    QRS:     QRS axis:  Normal  ST segments:     ST segments:  Normal  T waves:     T waves: normal          Medical Decision Making     I reviewed the patient's most recent Emergency Dept notes and diagnostic tests  in formulating my MDM on today's visit. Provider Notes (Medical Decision Making):   49-year-old male with history of obesity and hyperlipidemia and diabetes presenting with intermittent chest pain for weeks, none currently. No shortness of breath. Normal vital signs, clinically very well-appearing, sitting calmly in bed, no apparent distress or discomfort. He has a completely normal cardiopulmonary examination, no murmurs. No rales or rhonchi. EKG is nonischemic. We will check basic laboratories including a single troponin and a chest x-ray. H&P is not consistent with ACS or myocardial infarction, no significant concern for PE or pneumothorax or pneumonia. Would advise outpatient stress test and cardiology follow-up as he does have some risk factors for CAD, could be having angina.     Marina Walker MD  6:27 PM        Social History     Tobacco Use    Smoking status: Never Smoker    Smokeless tobacco: Current User     Types: Snuff    Tobacco comment: snuff   Substance Use Topics    Alcohol use: No    Drug use: No     Patient Vitals for the past 4 hrs:   Temp Pulse Resp BP SpO2   05/24/20 1900    134/65    05/24/20 1845  69 11  95 %   05/24/20 1830  68 (!) 0 136/73 97 %   05/24/20 1824     97 %   05/24/20 1748 98.6 °F (37 °C) 82 11 178/76 100 %            Consults:      Medications Administered during ED course:  Medications - No data to display       Current Discharge Medication List             Diagnosis and Disposition     Disposition:  Discharged    Clinical Impression:   1. Atypical chest pain        Attestation:  I personally performed the services described in this documentation on this date 5/24/2020 for patient Parks Goodpasture. Loree Dave MD        I was the first provider for this patient on this visit. To the best of my ability I reviewed relevant prior medical records, electrocardiograms, laboratories, and radiologic studies. The patient's presenting problems were discussed, and the patient was in agreement with the care plan formulated and outlined with them. Loree Dave MD    Please note that this dictation was completed with Dragon voice recognition software. Quite often unanticipated grammatical, syntax, homophones, and other interpretive errors are inadvertently transcribed by the computer software. Please disregard these errors and excuse any errors that have escaped final proofreading.

## 2020-05-24 NOTE — DISCHARGE INSTRUCTIONS
Your examination, EKG, and laboratories are all reassuring today. There are no signs of an immediate cardiac or pulmonary issue today. However we recommend that you pursue an outpatient stress test, which can be arranged through your primary care physician or cardiology office. If you develop worsening chest pain, fainting episodes, or other concerns, return to the emergency department for further evaluation.

## 2020-05-25 ENCOUNTER — PATIENT OUTREACH (OUTPATIENT)
Dept: FAMILY MEDICINE CLINIC | Age: 55
End: 2020-05-25

## 2020-05-25 LAB
ATRIAL RATE: 78 BPM
CALCULATED P AXIS, ECG09: 56 DEGREES
CALCULATED R AXIS, ECG10: -67 DEGREES
CALCULATED T AXIS, ECG11: 76 DEGREES
DIAGNOSIS, 93000: NORMAL
P-R INTERVAL, ECG05: 178 MS
Q-T INTERVAL, ECG07: 392 MS
QRS DURATION, ECG06: 114 MS
QTC CALCULATION (BEZET), ECG08: 446 MS
VENTRICULAR RATE, ECG03: 78 BPM

## 2020-05-25 NOTE — PROGRESS NOTES
Patient contacted regarding recent discharge and COVID-19 risk. Unable to reach patient by phone. Resolving episode.

## 2020-05-27 ENCOUNTER — TELEPHONE (OUTPATIENT)
Dept: INTERNAL MEDICINE CLINIC | Age: 55
End: 2020-05-27

## 2020-05-27 NOTE — TELEPHONE ENCOUNTER
Patient is having side effects from Metformin- stomach upset, fatigue. He said he was on Metformin extended release in the past and did ok on that. Is it ok to change?

## 2020-05-28 RX ORDER — METFORMIN HYDROCHLORIDE EXTENDED-RELEASE TABLETS 1000 MG/1
1000 TABLET, FILM COATED, EXTENDED RELEASE ORAL 2 TIMES DAILY
Qty: 60 TAB | Refills: 1 | Status: SHIPPED | OUTPATIENT
Start: 2020-05-28 | End: 2020-07-01 | Stop reason: ALTCHOICE

## 2020-05-28 RX ORDER — METFORMIN HYDROCHLORIDE 500 MG/1
1000 TABLET, FILM COATED, EXTENDED RELEASE ORAL 2 TIMES DAILY
Qty: 120 TAB | Refills: 0 | Status: CANCELLED | OUTPATIENT
Start: 2020-05-28

## 2020-05-28 NOTE — TELEPHONE ENCOUNTER
Eri states they are trying to get Metformin  and double the dose.     States it is covered on patients insurance

## 2020-05-28 NOTE — TELEPHONE ENCOUNTER
Please send pended Rx to [facility]:  Requested Prescriptions     Pending Prescriptions Disp Refills    metFORMIN (GLUMETZA ER) 500 mg TG24 24 hour tablet 120 Tab 0     Sig: Take 1,000 mg by mouth two (2) times a day.

## 2020-05-29 RX ORDER — METFORMIN HYDROCHLORIDE 500 MG/1
1000 TABLET, FILM COATED, EXTENDED RELEASE ORAL 2 TIMES DAILY
Qty: 120 TAB | Refills: 1 | Status: SHIPPED | OUTPATIENT
Start: 2020-05-29 | End: 2020-07-16

## 2020-05-29 NOTE — TELEPHONE ENCOUNTER
Metformin ER 1000mg not covered by patients insurance- the ER 500mg is covered. If ok, please send pended Rx to . Requested Prescriptions     Pending Prescriptions Disp Refills    metFORMIN (GLUMETZA ER) 500 mg TG24 24 hour tablet 120 Tab 1     Sig: Take 1,000 mg by mouth two (2) times a day.

## 2020-06-09 ENCOUNTER — TELEPHONE (OUTPATIENT)
Dept: INTERNAL MEDICINE CLINIC | Age: 55
End: 2020-06-09

## 2020-06-09 NOTE — TELEPHONE ENCOUNTER
Should take him to the [facility] or the emergency room tonight if they think he is having problems related to a stroke

## 2020-06-16 RX ORDER — GLIPIZIDE 10 MG/1
TABLET, FILM COATED, EXTENDED RELEASE ORAL
Qty: 60 TAB | Refills: 0 | Status: SHIPPED | OUTPATIENT
Start: 2020-06-16 | End: 2020-07-16

## 2020-06-22 ENCOUNTER — TELEPHONE (OUTPATIENT)
Dept: INTERNAL MEDICINE CLINIC | Age: 55
End: 2020-06-22

## 2020-06-22 NOTE — TELEPHONE ENCOUNTER
Patient mother called and states he need an appointment because he is having some type of emotional problem,    Please give him a call.

## 2020-06-23 NOTE — TELEPHONE ENCOUNTER
Spoke to patient- he has an appointment on 7-1-20 with Dr Rios Acuña and he said he is ok with waiting until then. Offered him an appointment 6-24-20 with Alex but he declined.

## 2020-06-28 RX ORDER — EMPAGLIFLOZIN 10 MG/1
TABLET, FILM COATED ORAL
Qty: 30 TAB | Refills: 0 | Status: SHIPPED | OUTPATIENT
Start: 2020-06-28 | End: 2020-07-28

## 2020-07-01 ENCOUNTER — OFFICE VISIT (OUTPATIENT)
Dept: INTERNAL MEDICINE CLINIC | Age: 55
End: 2020-07-01

## 2020-07-01 VITALS
BODY MASS INDEX: 31.08 KG/M2 | RESPIRATION RATE: 18 BRPM | HEIGHT: 75 IN | DIASTOLIC BLOOD PRESSURE: 70 MMHG | OXYGEN SATURATION: 98 % | TEMPERATURE: 98.8 F | SYSTOLIC BLOOD PRESSURE: 122 MMHG | HEART RATE: 81 BPM | WEIGHT: 250 LBS

## 2020-07-01 DIAGNOSIS — Z86.73 HISTORY OF CEREBELLAR STROKE: Chronic | ICD-10-CM

## 2020-07-01 DIAGNOSIS — E11.3299 TYPE 2 DIABETES MELLITUS WITH MILD NONPROLIFERATIVE RETINOPATHY, WITHOUT LONG-TERM CURRENT USE OF INSULIN, MACULAR EDEMA PRESENCE UNSPECIFIED, UNSPECIFIED LATERALITY (HCC): Primary | Chronic | ICD-10-CM

## 2020-07-01 DIAGNOSIS — E78.2 MIXED HYPERLIPIDEMIA: Chronic | ICD-10-CM

## 2020-07-01 DIAGNOSIS — E66.9 OBESITY (BMI 30-39.9): Chronic | ICD-10-CM

## 2020-07-01 DIAGNOSIS — Z79.899 LONG-TERM USE OF HIGH-RISK MEDICATION: ICD-10-CM

## 2020-07-01 DIAGNOSIS — F41.9 ANXIETY: Chronic | ICD-10-CM

## 2020-07-01 DIAGNOSIS — I10 ESSENTIAL HYPERTENSION: Chronic | ICD-10-CM

## 2020-07-01 LAB
A-G RATIO,AGRAT: 1.6 RATIO
ALBUMIN SERPL-MCNC: 4.7 G/DL (ref 3.9–5.4)
ALP SERPL-CCNC: 68 U/L (ref 38–126)
ALT SERPL-CCNC: 25 U/L (ref 0–50)
ANION GAP SERPL CALC-SCNC: 14 MMOL/L
AST SERPL W P-5'-P-CCNC: 27 U/L (ref 14–36)
BILIRUB SERPL-MCNC: 0.9 MG/DL (ref 0.2–1.3)
BILIRUB UR QL: NEGATIVE
BUN SERPL-MCNC: 15 MG/DL (ref 9–20)
BUN/CREATININE RATIO,BUCR: 17 RATIO
CALCIUM SERPL-MCNC: 10 MG/DL (ref 8.4–10.2)
CHLORIDE SERPL-SCNC: 101 MMOL/L (ref 98–107)
CHOL/HDL RATIO,CHHD: 3 RATIO (ref 0–4)
CHOLEST SERPL-MCNC: 105 MG/DL (ref 0–200)
CK SERPL-CCNC: 63 U/L (ref 30–135)
CLARITY: CLEAR
CO2 SERPL-SCNC: 28 MMOL/L (ref 22–32)
COLOR UR: ABNORMAL
CREAT SERPL-MCNC: 0.9 MG/DL (ref 0.8–1.5)
ERYTHROCYTE [DISTWIDTH] IN BLOOD BY AUTOMATED COUNT: 13.3 %
GLOBULIN,GLOB: 2.9
GLUCOSE 24H UR-MRATE: ABNORMAL G/(24.H)
GLUCOSE SERPL-MCNC: 89 MG/DL (ref 75–110)
HBA1C MFR BLD HPLC: 6.2 % (ref 4–5.7)
HCT VFR BLD AUTO: 47.3 % (ref 37–51)
HDLC SERPL-MCNC: 31 MG/DL (ref 35–130)
HGB BLD-MCNC: 15.9 G/DL (ref 12–18)
HGB UR QL STRIP: NEGATIVE
KETONES UR QL STRIP.AUTO: NEGATIVE
LDL/HDL RATIO,LDHD: 1 RATIO
LDLC SERPL CALC-MCNC: 44 MG/DL (ref 0–130)
LEUKOCYTE ESTERASE: NEGATIVE
LYMPHOCYTES ABSOLUTE: 1.6 K/UL (ref 0.6–4.1)
LYMPHOCYTES NFR BLD: 20.6 % (ref 10–58.5)
MCH RBC QN AUTO: 30.5 PG (ref 26–32)
MCHC RBC AUTO-ENTMCNC: 33.6 G/DL (ref 30–36)
MCV RBC AUTO: 90.8 FL (ref 80–97)
MICROALBUMIN, URINE: 20 MG/L (ref 0–20)
MONOCYTES ABS-DIF,2141: 0.6 K/UL (ref 0–1.8)
MONOCYTES NFR BLD: 7.7 % (ref 0.1–24)
NEUTROPHILS # BLD: 71.7 % (ref 37–92)
NEUTROPHILS ABS,2156: 5.5 K/UL (ref 2–7.8)
NITRITE UR QL STRIP.AUTO: NEGATIVE
PH UR STRIP: 6 [PH] (ref 5–7)
PLATELET # BLD AUTO: 232 K/UL (ref 140–440)
POTASSIUM SERPL-SCNC: 4.2 MMOL/L (ref 3.6–5)
PROT SERPL-MCNC: 7.6 G/DL (ref 6.3–8.2)
PROT UR STRIP-MCNC: NEGATIVE MG/DL
RBC # BLD AUTO: 5.21 M/UL (ref 4.2–6.3)
RBC #/AREA URNS HPF: ABNORMAL #/HPF
SODIUM SERPL-SCNC: 143 MMOL/L (ref 137–145)
SP GR UR REFRACTOMETRY: 1.02 (ref 1–1.03)
TRIGL SERPL-MCNC: 151 MG/DL (ref 0–200)
TSH SERPL DL<=0.05 MIU/L-ACNC: 1.45 UIU/ML (ref 0.34–5.6)
UROBILINOGEN UR QL STRIP.AUTO: NEGATIVE
VLDLC SERPL CALC-MCNC: 30 MG/DL
WBC # BLD AUTO: 7.7 K/UL (ref 4.1–10.9)
WBC URNS QL MICRO: ABNORMAL #/HPF

## 2020-07-01 RX ORDER — BUSPIRONE HYDROCHLORIDE 5 MG/1
5 TABLET ORAL
Qty: 90 TAB | Refills: 0 | Status: SHIPPED | OUTPATIENT
Start: 2020-07-01 | End: 2020-07-27 | Stop reason: SDUPTHER

## 2020-07-01 RX ORDER — MECLIZINE HYDROCHLORIDE 25 MG/1
TABLET ORAL DAILY
COMMUNITY
End: 2021-01-06 | Stop reason: ALTCHOICE

## 2020-07-01 NOTE — PROGRESS NOTES
This note will not be viewable in [address] Chi Armstrong is a 54 y.o. male and presents with Follow Up Chronic Condition (6 mo fu) and Anxiety  . Subjective:  Mr. Abdul returns to the office today in follow-up of multiple medical problems and also complaining of anxiety. The patient has type 2 diabetes mellitus for which she is on Jardiance, glipizide, metformin. The patient has not been checking his blood sugars and denies hypoglycemia. His diabetes has been complicated by retinopathy. He recently found out that his insurance no longer covers his ophthalmologist and he is in transition as far as finding another specialist to monitor the situation. He denies any burning paresthesias of his feet. The patient has hypertension. He is currently managing this with a no added salt diet. The patient does have a history of a cerebellar stroke. More [facility] and chest pain. He was given meclizine for this and was referred to cardiology and underwent a nuclear medicine stress test.  He currently is carrying a monitor to rule out arrhythmia. He denies any headaches, nausea vomiting focal neurological deficits. He has hyperlipidemia currently on Lipitor therapy. He tolerates this without muscle soreness or GI upset. New problem that the patient has had what has been the development of anxiety. Patient states that he has had this over the last 6 months but it began to worsen at the beginning of the [address] pandemic. He began noticing that he became anxious as far as driving, going over bridges and other activities. The patient was a career and has not been able to work in the last month. He feels as though his anxiety is gotten worse. He notes no panic attacks or tremor and denies any depression symptomatology. He does note a family history of a anxiety/depressive disorder in his father.     Past Medical History: Diagnosis Date    Diabetes [facility])     Essential hypertension     Hyperlipidemia     Long-term use of high-risk medication 3/26/2019    Stroke ([facility] [address] 01/2018    balance     Past Surgical History:   Procedure Laterality Date    COLONOSCOPY N/A 11/13/2019    COLONOSCOPY performed by Abdirashid Combs MD at [facility] AM[facility]ULATORY OR     No Known Allergies  Current Outpatient Medications   Medication Sig Dispense Refill    meclizine (ANTIVERT) 25 mg tablet Take  by mouth daily.  busPIRone (BUSPAR) 5 mg tablet Take 1 Tab by mouth three (3) times daily (with meals). 90 Tab 0    Jardiance 10 mg tablet Take 1 tablet by mouth once daily 30 Tab 0    glipiZIDE SR (GLUCOTROL XL) 10 mg CR tablet Take 1 tablet by mouth twice daily 60 Tab 0    metFORMIN (GLUMETZA ER) 500 mg TG24 24 hour tablet Take 1,000 mg by mouth two (2) times a day. 120 Tab 1    pantoprazole (PROTONIX) 40 mg tablet Take 1 tablet by mouth twice daily 60 Tab 2    atorvastatin (LIPITOR) 40 mg tablet Take 1 tablet by mouth once daily 30 Tab 3    aspirin delayed-release 81 mg tablet Take  by mouth daily.        Social History     Socioeconomic History    Marital status: SINGLE     Spouse name: Not on file    Number of children: Not on file    Years of education: Not on file    Highest education level: Not on file   Occupational History    Occupation: [employer]   Tobacco Use    Smoking status: Never Smoker    Smokeless tobacco: Current User     Types: Snuff    Tobacco comment: snuff   Substance and Sexual Activity    Alcohol use: No    Drug use: No     Family History   Problem Relation Age of Onset    Coronary Artery Disease Maternal Grandmother     Coronary Artery Disease Paternal Grandmother     Cancer Mother         vulvar    Diabetes Father        Health Maintenance   Topic Date Due    DTaP/Tdap/Td series (1 - Tdap) 02/25/1986    Shingrix Vaccine Age 50> (1 of 2) 02/25/2015    FOBT Q1Y Age 50-75  02/25/2015    Foot Exam Q1  03/26/2020    Pneumococcal 0-64 years (1 of 1 - PPSV23) 10/01/2020 (Originally 2/25/1971)    Influenza Age 5 to Adult  08/01/2020    A1C test (Diabetic or Prediabetic)  10/01/2020    MICROALBUMIN Q1  10/01/2020    Lipid Screen  10/01/2020    Eye Exam Retinal or Dilated  01/07/2022    Hepatitis C Screening  Completed        Review of Systems  Constitutional: negative for fevers, chills, anorexia and weight loss  Eyes:   negative for visual disturbance and irritation  ENT:   negative for tinnitus,sore throat,nasal congestion,ear pain,hoarseness  Respiratory:  negative for cough, hemoptysis, dyspnea,wheezing  CV:   Positive for recent chest pain for which she is seeing a cardiologist GI:   negative for nausea, vomiting, diarrhea, abdominal pain,melena  Endo:               negative for polyuria,polydipsia,polyphagia,heat intolerance  Genitourinary: negative for frequency, dysuria and hematuria  Integumentary: negative for rash and pruritus  Hematologic:  negative for easy bruising and gum/nose bleeding  Musculoskel: negative for myalgias, arthralgias, back pain, muscle weakness, joint pain  Neurological:  Positive for recent vertigo for which she was given Antivert and is slowly improving  Behavl/Psych: Positive for feelings of anxiety without depression ROS otherwise negative      Objective:  Visit Vitals  /70 (BP 1 Location: Left arm, BP Patient Position: Sitting)   Pulse 81   Temp 98.8 °F (37.1 °C) (Oral)   Resp 18   Ht 6' 3\" (1.905 m)   Wt 250 lb (113.4 kg)   SpO2 98%   BMI 31.25 kg/m²     Body mass index is 31.25 kg/m².     Physical Exam:   General appearance - alert, well appearing, and in no distress  Mental status - alert, oriented to person, place, and time  EYE-RACHEL, EOMI,conjunctiva normal bilaterally, lids normal  ENT-ENT exam normal, no neck nodes or sinus tenderness  Nose - normal and patent, no erythema,  Or discharge   Mouth - mucous membranes moist, pharynx normal without lesions  Neck - supple, no significant adenopathy or bruit  Chest - clear to auscultation, no wheezes, rales or rhonchi. Heart - normal rate, regular rhythm, normal S1, S2, no murmurs, rubs, clicks or gallops   Abdomen - soft, nontender, nondistended, no masses or organomegaly  Lymph- no adenopathy palpable  Ext-peripheral pulses normal, no pedal edema, no clubbing or cyanosis  Skin-Warm and dry. 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 9)    Anxiety  -     busPIRone (BUSPAR) 5 mg tablet; Take 1 Tab by mouth three (3) times daily (with meals). , Normal, Disp-90 Tab, R-0        Other instructions: The patient's medications were reviewed and reconciled. No change in his current medical regimen will be made. Consider start of once daily blood sugar checks    A no added salt, prudent diet is encouraged    Have talked to the patient at length in regards to his anxiety symptoms and he would benefit from medication management. We will start him on BuSpar 5 mg 3 times a day. If no improvement is noted in his anxiety after 2 weeks will consider increasing to 10 mg dosage.     Await results of multiple labs    Follow-up in 6 months    Follow-up and Dispositions    · Return in about 6 months (around 1/1/2021). I have reviewed with the patient details of the assessment and plan and all questions were answered. Relevent patient education was performed. The most recent lab findings were reviewed with the patient. An After Visit Summary was printed and given to the patient. Marika Maria MD    Please note that this dictation was completed with [employer], the computer voice recognition software. Quite often unanticipated grammatical, syntax, homophones, and other interpretive errors are inadvertently transcribed by the computer software. Please disregard these errors. Please excuse any errors that have escaped final proofreading.

## 2020-07-01 NOTE — PROGRESS NOTES
Sena Quintero is a 54 y.o. male presenting for Follow Up Chronic Condition (6 mo fu) and Anxiety  . 1. Have you been to the ER, urgent care clinic since your last visit? Hospitalized since your last visit? [facility] for dizziness    2. Have you seen or consulted any other health care providers outside of the [address] [place] [address] since your last visit? Include any pap smears or colon screening. Dr Corinn Riedel    No flowsheet data found. No flowsheet data found. 3 most recent PHQ Screens 7/1/2020   Little interest or pleasure in doing things Several days   Feeling down, depressed, irritable, or hopeless Several days   Total Score PHQ 2 2       There are no discontinued medications.

## 2020-07-01 NOTE — PATIENT INSTRUCTIONS

## 2020-07-16 RX ORDER — GLIPIZIDE 10 MG/1
TABLET, FILM COATED, EXTENDED RELEASE ORAL
Qty: 60 TAB | Refills: 0 | Status: SHIPPED | OUTPATIENT
Start: 2020-07-16 | End: 2020-08-29

## 2020-07-16 RX ORDER — METFORMIN HYDROCHLORIDE 500 MG/1
TABLET, EXTENDED RELEASE ORAL
Qty: 120 TAB | Refills: 0 | Status: SHIPPED | OUTPATIENT
Start: 2020-07-16 | End: 2020-08-17

## 2020-07-27 DIAGNOSIS — F41.9 ANXIETY: Chronic | ICD-10-CM

## 2020-07-27 RX ORDER — BUSPIRONE HYDROCHLORIDE 5 MG/1
5 TABLET ORAL
Qty: 90 TAB | Refills: 0 | Status: SHIPPED | OUTPATIENT
Start: 2020-07-27 | End: 2020-08-29

## 2020-07-27 NOTE — TELEPHONE ENCOUNTER
Requested Prescriptions     Pending Prescriptions Disp Refills    busPIRone (BUSPAR) 5 mg tablet 90 Tab 0     Sig: Take 1 Tab by mouth three (3) times daily (with meals). Last Refill: 7/1/20  Next Appointment:1/6/21      Patient states he is doing well on this medication.

## 2020-07-28 RX ORDER — EMPAGLIFLOZIN 10 MG/1
TABLET, FILM COATED ORAL
Qty: 30 TAB | Refills: 0 | Status: SHIPPED | OUTPATIENT
Start: 2020-07-28 | End: 2020-08-27

## 2020-07-28 RX ORDER — PANTOPRAZOLE SODIUM 40 MG/1
TABLET, DELAYED RELEASE ORAL
Qty: 60 TAB | Refills: 0 | Status: SHIPPED | OUTPATIENT
Start: 2020-07-28 | End: 2020-08-27

## 2020-08-27 RX ORDER — EMPAGLIFLOZIN 10 MG/1
TABLET, FILM COATED ORAL
Qty: 30 TAB | Refills: 0 | Status: SHIPPED | OUTPATIENT
Start: 2020-08-27 | End: 2020-09-26

## 2020-08-27 RX ORDER — PANTOPRAZOLE SODIUM 40 MG/1
TABLET, DELAYED RELEASE ORAL
Qty: 60 TAB | Refills: 0 | Status: SHIPPED | OUTPATIENT
Start: 2020-08-27 | End: 2020-09-26

## 2020-08-29 DIAGNOSIS — F41.9 ANXIETY: Chronic | ICD-10-CM

## 2020-08-29 RX ORDER — GLIPIZIDE 10 MG/1
TABLET, FILM COATED, EXTENDED RELEASE ORAL
Qty: 60 TAB | Refills: 0 | Status: SHIPPED | OUTPATIENT
Start: 2020-08-29 | End: 2020-09-26

## 2020-08-29 RX ORDER — BUSPIRONE HYDROCHLORIDE 5 MG/1
TABLET ORAL
Qty: 90 TAB | Refills: 0 | Status: SHIPPED | OUTPATIENT
Start: 2020-08-29 | End: 2020-09-26

## 2021-01-06 ENCOUNTER — OFFICE VISIT (OUTPATIENT)
Dept: INTERNAL MEDICINE CLINIC | Age: 56
End: 2021-01-06
Payer: MEDICAID

## 2021-01-06 VITALS
WEIGHT: 252.4 LBS | RESPIRATION RATE: 18 BRPM | HEART RATE: 70 BPM | DIASTOLIC BLOOD PRESSURE: 78 MMHG | SYSTOLIC BLOOD PRESSURE: 122 MMHG | TEMPERATURE: 98.9 F | HEIGHT: 75 IN | OXYGEN SATURATION: 99 % | BODY MASS INDEX: 31.38 KG/M2

## 2021-01-06 DIAGNOSIS — I10 ESSENTIAL HYPERTENSION: Chronic | ICD-10-CM

## 2021-01-06 DIAGNOSIS — E78.2 MIXED HYPERLIPIDEMIA: Chronic | ICD-10-CM

## 2021-01-06 DIAGNOSIS — K21.9 GASTROESOPHAGEAL REFLUX DISEASE WITHOUT ESOPHAGITIS: Chronic | ICD-10-CM

## 2021-01-06 DIAGNOSIS — Z79.899 LONG-TERM USE OF HIGH-RISK MEDICATION: Chronic | ICD-10-CM

## 2021-01-06 DIAGNOSIS — E11.3299 TYPE 2 DIABETES MELLITUS WITH MILD NONPROLIFERATIVE RETINOPATHY, WITHOUT LONG-TERM CURRENT USE OF INSULIN, MACULAR EDEMA PRESENCE UNSPECIFIED, UNSPECIFIED LATERALITY (HCC): Primary | Chronic | ICD-10-CM

## 2021-01-06 DIAGNOSIS — Z86.73 HISTORY OF CEREBELLAR STROKE: Chronic | ICD-10-CM

## 2021-01-06 DIAGNOSIS — F41.9 ANXIETY: Chronic | ICD-10-CM

## 2021-01-06 DIAGNOSIS — E66.9 OBESITY (BMI 30-39.9): Chronic | ICD-10-CM

## 2021-01-06 LAB
A-G RATIO,AGRAT: 1.6 RATIO
ALBUMIN SERPL-MCNC: 4.8 G/DL (ref 3.9–5.4)
ALP SERPL-CCNC: 66 U/L (ref 38–126)
ALT SERPL-CCNC: 26 U/L (ref 0–50)
ANION GAP SERPL CALC-SCNC: 14 MMOL/L
AST SERPL W P-5'-P-CCNC: 27 U/L (ref 14–36)
BILIRUB SERPL-MCNC: 0.7 MG/DL (ref 0.2–1.3)
BILIRUB UR QL: NEGATIVE
BUN SERPL-MCNC: 20 MG/DL (ref 9–20)
BUN/CREATININE RATIO,BUCR: 22 RATIO
CALCIUM SERPL-MCNC: 10.1 MG/DL (ref 8.4–10.2)
CHLORIDE SERPL-SCNC: 102 MMOL/L (ref 98–107)
CHOL/HDL RATIO,CHHD: 4 RATIO (ref 0–4)
CHOLEST SERPL-MCNC: 119 MG/DL (ref 0–200)
CK SERPL-CCNC: 88 U/L (ref 30–135)
CLARITY: CLEAR
CO2 SERPL-SCNC: 30 MMOL/L (ref 22–32)
COLOR UR: ABNORMAL
CREAT SERPL-MCNC: 0.9 MG/DL (ref 0.8–1.5)
ERYTHROCYTE [DISTWIDTH] IN BLOOD BY AUTOMATED COUNT: 13.8 %
GLOBULIN,GLOB: 3
GLUCOSE 24H UR-MRATE: ABNORMAL G/(24.H)
GLUCOSE SERPL-MCNC: 89 MG/DL (ref 75–110)
HBA1C MFR BLD HPLC: 5.9 % (ref 4–5.7)
HCT VFR BLD AUTO: 50.2 % (ref 37–51)
HDLC SERPL-MCNC: 33 MG/DL (ref 35–130)
HGB BLD-MCNC: 16.2 G/DL (ref 12–18)
HGB UR QL STRIP: NEGATIVE
KETONES UR QL STRIP.AUTO: NEGATIVE
LDL/HDL RATIO,LDHD: 2 RATIO
LDLC SERPL CALC-MCNC: 57 MG/DL (ref 0–130)
LEUKOCYTE ESTERASE: NEGATIVE
LYMPHOCYTES ABSOLUTE: 2.4 K/UL (ref 0.6–4.1)
LYMPHOCYTES NFR BLD: 26.8 % (ref 10–58.5)
MCH RBC QN AUTO: 29.7 PG (ref 26–32)
MCHC RBC AUTO-ENTMCNC: 32.3 G/DL (ref 30–36)
MCV RBC AUTO: 92.2 FL (ref 80–97)
MICROALBUMIN, URINE: 20 MG/L (ref 0–20)
MONOCYTES ABS-DIF,2141: 0.8 K/UL (ref 0–1.8)
MONOCYTES NFR BLD: 8.4 % (ref 0.1–24)
NEUTROPHILS # BLD: 64.8 % (ref 37–92)
NEUTROPHILS ABS,2156: 5.9 K/UL (ref 2–7.8)
NITRITE UR QL STRIP.AUTO: NEGATIVE
PH UR STRIP: 5 [PH] (ref 5–7)
PLATELET # BLD AUTO: 223 K/UL (ref 140–440)
POTASSIUM SERPL-SCNC: 4.3 MMOL/L (ref 3.6–5)
PROT SERPL-MCNC: 7.8 G/DL (ref 6.3–8.2)
PROT UR STRIP-MCNC: NEGATIVE MG/DL
RBC # BLD AUTO: 5.45 M/UL (ref 4.2–6.3)
RBC #/AREA URNS HPF: 0 #/HPF
SODIUM SERPL-SCNC: 146 MMOL/L (ref 137–145)
SP GR UR REFRACTOMETRY: 1.02 (ref 1–1.03)
TRIGL SERPL-MCNC: 145 MG/DL (ref 0–200)
TSH SERPL DL<=0.05 MIU/L-ACNC: 2.76 UIU/ML (ref 0.34–5.6)
UROBILINOGEN UR QL STRIP.AUTO: NEGATIVE
VLDLC SERPL CALC-MCNC: 29 MG/DL
WBC # BLD AUTO: 9.1 K/UL (ref 4.1–10.9)
WBC URNS QL MICRO: ABNORMAL #/HPF

## 2021-01-06 PROCEDURE — 81001 URINALYSIS AUTO W/SCOPE: CPT | Performed by: INTERNAL MEDICINE

## 2021-01-06 PROCEDURE — 83036 HEMOGLOBIN GLYCOSYLATED A1C: CPT | Performed by: INTERNAL MEDICINE

## 2021-01-06 PROCEDURE — 80061 LIPID PANEL: CPT | Performed by: INTERNAL MEDICINE

## 2021-01-06 PROCEDURE — 80053 COMPREHEN METABOLIC PANEL: CPT | Performed by: INTERNAL MEDICINE

## 2021-01-06 PROCEDURE — 85025 COMPLETE CBC W/AUTO DIFF WBC: CPT | Performed by: INTERNAL MEDICINE

## 2021-01-06 PROCEDURE — 36415 COLL VENOUS BLD VENIPUNCTURE: CPT | Performed by: INTERNAL MEDICINE

## 2021-01-06 PROCEDURE — 82550 ASSAY OF CK (CPK): CPT | Performed by: INTERNAL MEDICINE

## 2021-01-06 PROCEDURE — 84443 ASSAY THYROID STIM HORMONE: CPT | Performed by: INTERNAL MEDICINE

## 2021-01-06 PROCEDURE — 82044 UR ALBUMIN SEMIQUANTITATIVE: CPT | Performed by: INTERNAL MEDICINE

## 2021-01-06 PROCEDURE — 99214 OFFICE O/P EST MOD 30 MIN: CPT | Performed by: INTERNAL MEDICINE

## 2021-01-06 NOTE — PROGRESS NOTES
Terry Lockhart is a 54 y.o. male presenting for Follow Up Chronic Condition (6 mo fu)  . 1. Have you been to the ER, urgent care clinic since your last visit? Hospitalized since your last visit? No    2. Have you seen or consulted any other health care providers outside of the [address] [place] [address] since your last visit? Include any pap smears or colon screening. No    No flowsheet data found. No flowsheet data found. 3 most recent PHQ Screens 1/6/2021   Little interest or pleasure in doing things Not at all   Feeling down, depressed, irritable, or hopeless Not at all   Total Score PHQ 2 0       There are no discontinued medications.

## 2021-01-06 NOTE — PROGRESS NOTES
Subjective:     Mr. Rene Diaz returns to the office today in follow-up of multiple medical problems. The patient has type 2 diabetes mellitus for which she is on Jardiance, glipizide and Metformin. He states that he has not been checking his blood sugars recently. He denies polyuria, polydipsia, blurred vision or unexplained weight loss. He is up-to-date on diabetic retinal eye exam.  He denies burning paresthesias of his feet. The patient has hypertension which is currently being managed with a no added salt diet. Patient has had a previous cerebellar stroke. He does remain on a daily aspirin. He denies headaches, numbness, tingling, dizzy spells or focal neurological problems. He has hypercholesterolemia currently on statin therapy. He denies muscle soreness or GI upset. He has no history of coronary artery disease and denies exertional chest pains or claudication. GERD currently managed on PPI therapy. He denies breakthrough heartburn and denies dysphagia, early satiety or unexplained weight loss. He has a chronic history of anxiety and does remain on BuSpar for this. This continues to work effectively without breakthrough symptoms and he has had no long-term side effects related to the medication usage.     Past Medical History:   Diagnosis Date    Diabetes ([facility] [address]     Essential hypertension     Hyperlipidemia     Long-term use of high-risk medication 3/26/2019    Stroke ([facility] [address] 01/2018    balance     Past Surgical History:   Procedure Laterality Date    COLONOSCOPY N/A 11/13/2019    COLONOSCOPY performed by Delmi Watson [facility] at [facility] AM[facility]ULAT[facility]     No Known Allergies  Current Outpatient Medications   Medication Sig Dispense Refill    pantoprazole (PROTONIX) 40 mg tablet Take 1 tablet by mouth twice daily 60 Tab 3    Jardiance 10 mg tablet Take 1 tablet by mouth once daily 30 Tab 3    glipiZIDE SR (GLUCOTROL XL) 10 mg CR tablet Take 1 tablet by mouth twice daily 60 Tab 3    busPIRone (BUSPAR) 5 mg tablet TAKE 1 TABLET BY MOUTH THREE TIMES DAILY WITH MEALS 90 Tab 3    metFORMIN ER (GLUCOPHAGE XR) 500 mg tablet Take 2 tablets by mouth twice daily 120 Tab 4    atorvastatin (LIPITOR) 40 mg tablet Take 1 tablet by mouth once daily 30 Tab 4    aspirin delayed-release 81 mg tablet Take  by mouth daily. Social History     Socioeconomic History    Marital status: SINGLE     Spouse name: Not on file    Number of children: Not on file    Years of education: Not on file    Highest education level: Not on file   Occupational History    Occupation: [employer]   Tobacco Use    Smoking status: Never Smoker    Smokeless tobacco: Current User     Types: Snuff    Tobacco comment: snuff   Substance and Sexual Activity    Alcohol use: No    Drug use: No     Family History   Problem Relation Age of Onset    Coronary Artery Disease Maternal Grandmother     Coronary Artery Disease Paternal Grandmother    [address] [facility] Cancer Mother         vulvar    Diabetes Father        Review of Systems:  GEN: no weight loss, weight gain, fatigue or night sweats  CV: no PND, orthopnea, or palpitations  Resp: no dyspnea on exertion, no cough  Abd: no nausea, vomiting or diarrhea  EXT: denies edema, claudication  Endocrine: no hair loss, excessive thirst or polyuria  Neurological ROS: no TIA or stroke symptoms  ROS otherwise negative      Objective:     Visit Vitals  /78 (BP 1 Location: Right arm, BP Patient Position: Sitting)   Pulse 70   Temp 98.9 °F (37.2 °C) (Oral)   Resp 18   Ht 6' 3\" (1.905 m)   Wt 252 lb 6.4 oz (114.5 kg)   SpO2 99%   BMI 31.55 kg/m²     Body mass index is 31.55 kg/m². General:   alert, cooperative and no distress   Eyes: conjunctivae/sclerae clear.  PERRL, EOM's intact   Mouth:  No oral lesions, no pharyngeal erythema, no exudates   Neck: Trachea midline, no thyromegaly, no bruits   Heart: S1 and S2 normal,no murmurs noted    Lungs: Clear to auscultation bilaterally, no increased work of breathing   Abdomen: Soft, nontender. 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 9)    Gastroesophageal reflux disease without esophagitis        Other instructions: The patient's medications were reviewed and reconciled. No change in his current medical regimen will be made. A no added salt, prudent diet is encouraged    Have recommended once daily blood sugar checks    Weight loss encouraged due to body mass index of 31.6    Await results of multiple labs    Follow-up 6 months    Follow-up and Dispositions    · Return in about 6 months (around 7/6/2021). Pierce Troy MD    Please note that this dictation was completed with [employer], the computer voice recognition software. Quite often unanticipated grammatical, syntax, homophones, and other interpretive errors are inadvertently transcribed by the computer software. Please disregard these errors. Please excuse any errors that have escaped final proofreading.

## 2021-01-06 NOTE — PATIENT INSTRUCTIONS

## 2021-01-14 DIAGNOSIS — E78.2 MIXED HYPERLIPIDEMIA: Chronic | ICD-10-CM

## 2021-01-14 RX ORDER — ATORVASTATIN CALCIUM 40 MG/1
TABLET, FILM COATED ORAL
Qty: 30 TAB | Refills: 0 | Status: SHIPPED | OUTPATIENT
Start: 2021-01-14 | End: 2021-02-12

## 2021-01-14 RX ORDER — METFORMIN HYDROCHLORIDE 500 MG/1
TABLET, EXTENDED RELEASE ORAL
Qty: 120 TAB | Refills: 0 | Status: SHIPPED | OUTPATIENT
Start: 2021-01-14 | End: 2021-02-12

## 2021-01-23 RX ORDER — EMPAGLIFLOZIN 10 MG/1
TABLET, FILM COATED ORAL
Qty: 30 TAB | Refills: 0 | Status: SHIPPED | OUTPATIENT
Start: 2021-01-23 | End: 2021-02-23

## 2021-01-23 RX ORDER — PANTOPRAZOLE SODIUM 40 MG/1
TABLET, DELAYED RELEASE ORAL
Qty: 60 TAB | Refills: 0 | Status: SHIPPED | OUTPATIENT
Start: 2021-01-23 | End: 2021-02-23

## 2021-02-12 DIAGNOSIS — E78.2 MIXED HYPERLIPIDEMIA: Chronic | ICD-10-CM

## 2021-02-12 RX ORDER — METFORMIN HYDROCHLORIDE 500 MG/1
TABLET, EXTENDED RELEASE ORAL
Qty: 120 TAB | Refills: 0 | Status: SHIPPED | OUTPATIENT
Start: 2021-02-12 | End: 2021-03-13

## 2021-02-12 RX ORDER — ATORVASTATIN CALCIUM 40 MG/1
TABLET, FILM COATED ORAL
Qty: 30 TAB | Refills: 0 | Status: SHIPPED | OUTPATIENT
Start: 2021-02-12 | End: 2021-03-13

## 2021-06-23 DIAGNOSIS — F41.9 ANXIETY: Chronic | ICD-10-CM

## 2021-06-23 RX ORDER — GLIPIZIDE 10 MG/1
TABLET, FILM COATED, EXTENDED RELEASE ORAL
Qty: 60 TABLET | Refills: 0 | Status: SHIPPED | OUTPATIENT
Start: 2021-06-23 | End: 2021-07-27

## 2021-06-23 RX ORDER — BUSPIRONE HYDROCHLORIDE 5 MG/1
TABLET ORAL
Qty: 90 TABLET | Refills: 0 | Status: SHIPPED | OUTPATIENT
Start: 2021-06-23 | End: 2021-07-27

## 2021-07-06 ENCOUNTER — OFFICE VISIT (OUTPATIENT)
Dept: INTERNAL MEDICINE CLINIC | Age: 56
End: 2021-07-06
Payer: MEDICAID

## 2021-07-06 VITALS
DIASTOLIC BLOOD PRESSURE: 76 MMHG | OXYGEN SATURATION: 97 % | TEMPERATURE: 98.1 F | RESPIRATION RATE: 20 BRPM | HEART RATE: 74 BPM | BODY MASS INDEX: 32.23 KG/M2 | HEIGHT: 75 IN | WEIGHT: 259.2 LBS | SYSTOLIC BLOOD PRESSURE: 120 MMHG

## 2021-07-06 DIAGNOSIS — I10 ESSENTIAL HYPERTENSION: Primary | Chronic | ICD-10-CM

## 2021-07-06 DIAGNOSIS — F41.9 ANXIETY: Chronic | ICD-10-CM

## 2021-07-06 DIAGNOSIS — E11.3299 TYPE 2 DIABETES MELLITUS WITH MILD NONPROLIFERATIVE RETINOPATHY, WITHOUT LONG-TERM CURRENT USE OF INSULIN, MACULAR EDEMA PRESENCE UNSPECIFIED, UNSPECIFIED LATERALITY (HCC): Chronic | ICD-10-CM

## 2021-07-06 DIAGNOSIS — E78.2 MIXED HYPERLIPIDEMIA: Chronic | ICD-10-CM

## 2021-07-06 DIAGNOSIS — E66.9 OBESITY (BMI 30-39.9): Chronic | ICD-10-CM

## 2021-07-06 DIAGNOSIS — K21.9 GASTROESOPHAGEAL REFLUX DISEASE WITHOUT ESOPHAGITIS: Chronic | ICD-10-CM

## 2021-07-06 DIAGNOSIS — Z86.73 HISTORY OF CEREBELLAR STROKE: Chronic | ICD-10-CM

## 2021-07-06 DIAGNOSIS — Z79.899 LONG-TERM USE OF HIGH-RISK MEDICATION: Chronic | ICD-10-CM

## 2021-07-06 PROCEDURE — 99214 OFFICE O/P EST MOD 30 MIN: CPT | Performed by: INTERNAL MEDICINE

## 2021-07-06 NOTE — PATIENT INSTRUCTIONS
Learning About Carbohydrate (Carb) Counting and Eating Out When You Have Diabetes  Why plan your meals? Meal planning can be a key part of managing diabetes. Planning meals and snacks with the right balance of carbohydrate, protein, and fat can help you keep your blood sugar at the target level you set with your doctor. You don't have to eat special foods. You can eat what your family eats, including sweets once in a while. But you do have to pay attention to how often you eat and how much you eat of certain foods. You may want to work with a dietitian or a certified diabetes educator. He or she can give you tips and meal ideas and can answer your questions about meal planning. This health professional can also help you reach a healthy weight if that is one of your goals. What should you know about eating carbs? Managing the amount of carbohydrate (carbs) you eat is an important part of healthy meals when you have diabetes. Carbohydrate is found in many foods. · Learn which foods have carbs. And learn the amounts of carbs in different foods. ? Bread, cereal, pasta, and rice have about 15 grams of carbs in a serving. A serving is 1 slice of bread (1 ounce), ½ cup of cooked cereal, or 1/3 cup of cooked pasta or rice. ? Fruits have 15 grams of carbs in a serving. A serving is 1 small fresh fruit, such as an apple or orange; ½ of a banana; ½ cup of cooked or canned fruit; ½ cup of fruit juice; 1 cup of melon or raspberries; or 2 tablespoons of dried fruit. ? Milk and no-sugar-added yogurt have 15 grams of carbs in a serving. A serving is 1 cup of milk or 2/3 cup of no-sugar-added yogurt. ? Starchy vegetables have 15 grams of carbs in a serving. A serving is ½ cup of mashed potatoes or sweet potato; 1 cup winter squash; ½ of a small baked potato; ½ cup of cooked beans; or ½ cup cooked corn or green peas.   · Learn how much carbs to eat each day and at each meal. A dietitian or CDE can teach you how to keep track of the amount of carbs you eat. This is called carbohydrate counting. · If you are not sure how to count carbohydrate grams, use the Plate Method to plan meals. It is a good, quick way to make sure that you have a balanced meal. It also helps you spread carbs throughout the day. ? Divide your plate by types of foods. Put non-starchy vegetables on half the plate, meat or other protein food on one-quarter of the plate, and a grain or starchy vegetable in the final quarter of the plate. To this you can add a small piece of fruit and 1 cup of milk or yogurt, depending on how many carbs you are supposed to eat at a meal.  · Try to eat about the same amount of carbs at each meal. Do not \"save up\" your daily allowance of carbs to eat at one meal.  · Proteins have very little or no carbs per serving. Examples of proteins are beef, chicken, turkey, fish, eggs, tofu, cheese, cottage cheese, and peanut butter. A serving size of meat is 3 ounces, which is about the size of a deck of cards. Examples of meat substitute serving sizes (equal to 1 ounce of meat) are 1/4 cup of cottage cheese, 1 egg, 1 tablespoon of peanut butter, and ½ cup of tofu. How can you eat out and still eat healthy? · Learn to estimate the serving sizes of foods that have carbohydrate. If you measure food at home, it will be easier to estimate the amount in a serving of restaurant food. · If the meal you order has too much carbohydrate (such as potatoes, corn, or baked beans), ask to have a low-carbohydrate food instead. Ask for a salad or green vegetables. · If you use insulin, check your blood sugar before and after eating out to help you plan how much to eat in the future. · If you eat more carbohydrate at a meal than you had planned, take a walk or do other exercise. This will help lower your blood sugar. What are some tips for eating healthy? · Limit saturated fat, such as the fat from meat and dairy products.  This is a healthy choice because people who have diabetes are at higher risk of heart disease. So choose lean cuts of meat and nonfat or low-fat dairy products. Use olive or canola oil instead of butter or shortening when cooking. · Don't skip meals. Your blood sugar may drop too low if you skip meals and take insulin or certain medicines for diabetes. · Check with your doctor before you drink alcohol. Alcohol can cause your blood sugar to drop too low. Alcohol can also cause a bad reaction if you take certain diabetes medicines. Follow-up care is a key part of your treatment and safety. Be sure to make and go to all appointments, and call your doctor if you are having problems. It's also a good idea to know your test results and keep a list of the medicines you take. Where can you learn more? Go to http://www.elder.com/  Enter I147 in the search box to learn more about \"Learning About Carbohydrate (Carb) Counting and Eating Out When You Have Diabetes. \"  Current as of: August 31, 2020               Content Version: 12.8  © 2006-2021 [employer]. Care instructions adapted under license by [employer] (which disclaims liability or warranty for this information). If you have questions about a medical condition or this instruction, always ask your healthcare professional. [address] any warranty or liability for your use of this information.

## 2021-07-06 NOTE — PROGRESS NOTES
Subjective:     Mr. Edmundo Reyes returns to the office today in follow-up of multiple medical problems. 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 He does not check his blood sugars. He denies polyuria, polydipsia, blurred vision or unexplained weight loss. He denies burning paresthesias of his feet. States that he is up-to-date on diabetic retinal eye exams. He remains on Lipitor for hypercholesterolemia. Tolerates this without muscle soreness or GI upset. Has no history of coronary artery disease and denies exertional chest pains or claudication. Blood pressure is currently managed with a no added salt diet. Patient has a prior history of cerebellar infarct. He denies headaches, numbness, tingling or focal neurological problems. He continues on a daily aspirin. GERD is currently managed on PPI therapy. He denies breakthrough heartburn and has had no dysphagia, early satiety or unexplained weight loss. Anxiety has been well controlled on BuSpar. Patient denies any breakthrough anxiety or panic symptoms and has had no long-term side effects related to his medication.     Past Medical History:   Diagnosis Date    Diabetes ([facility] [address]     Essential hypertension     Hyperlipidemia     Long-term use of high-risk medication 3/26/2019    Stroke ([facility] [address] 01/2018    balance     Past Surgical History:   Procedure Laterality Date    COLONOSCOPY N/A 11/13/2019    COLON[facility] performed by Anne Jade MD [facility] AM[facility]     No Known Allergies  Current Outpatient Medications   Medication Sig Dispense Refill    glipiZIDE SR (GLUCOTROL XL) 10 mg CR tablet Take 1 tablet by mouth twice daily 60 Tablet 0    busPIRone (BUSPAR) 5 mg tablet TAKE 1 TABLET BY MOUTH THREE TIMES DAILY WITH MEALS 90 Tablet 0    metFORMIN ER (GLUCOPHAGE XR) 500 mg tablet Take 2 tablets by mouth twice daily 120 Tab 3    atorvastatin (LIPITOR) 40 mg tablet Take 1 tablet by mouth once daily 30 Tab 3    Jardiance 10 mg tablet Take 1 tablet by mouth once daily 30 Tab 5    pantoprazole (PROTONIX) 40 mg tablet Take 1 tablet by mouth twice daily 60 Tab 5    aspirin delayed-release 81 mg tablet Take  by mouth daily. Social History     Socioeconomic History    Marital status: SINGLE     Spouse name: Not on file    Number of children: Not on file    Years of education: Not on file    Highest education level: Not on file   Occupational History    Occupation: [employer]   Tobacco Use    Smoking status: Never Smoker    Smokeless tobacco: Current User     Types: Snuff    Tobacco comment: snuff   Vaping Use    Vaping Use: Never used   Substance and Sexual Activity    Alcohol use: No    Drug use: No     Social Determinants of Health     Financial Resource Strain:     Difficulty of Paying Living Expenses:    Food Insecurity:     Worried About Running Out of Food in the Last Year:     [address] in the Last Year:    Transportation Needs:     Lack of Transportation (Medical):      Lack of Transportation (Non-Medical):    Physical Activity:     Days of Exercise per Week:     Minutes of Exercise per Session:    Stress:     Feeling of Stress :    Social Connections:     Frequency of Communication with Friends and Family:     Frequency of Social Gatherings with Friends and Family:     Attends Orthodox Services:     Active Member of Clubs or Organizations:     Attends Club or Organization Meetings:     Marital Status:      Family History   Problem Relation Age of Onset    Coronary Artery Disease Maternal Grandmother     Coronary Artery Disease Paternal Grandmother     Cancer Mother         vulvar    Diabetes Father        Review of Systems:  GEN: no weight loss, weight gain, fatigue or night sweats  CV: no PND, orthopnea, or palpitations  Resp: no dyspnea on exertion, no cough  Abd: no nausea, vomiting or diarrhea  EXT: denies edema, claudication  Endocrine: no hair loss, excessive thirst or polyuria  Neurological ROS: no TIA or stroke symptoms  ROS otherwise negative      Objective:     Visit Vitals  /76   Pulse 74   Temp 98.1 °F (36.7 °C) (Oral)   Resp 20   Ht 6' 3\" (1.905 m)   Wt 259 lb 3.2 oz (117.6 kg)   SpO2 97%   BMI 32.40 kg/m²     Body mass index is 32.4 kg/m². General:   alert, cooperative and no distress   Eyes: conjunctivae/sclerae clear. PERRL, EOM's intact   Mouth:  No oral lesions, no pharyngeal erythema, no exudates   Neck: Trachea midline, no thyromegaly, no bruits   Heart: S1 and S2 normal,no murmurs noted    Lungs: Clear to auscultation bilaterally, no increased work of breathing   Abdomen: Soft, nontender. Normal bowel sounds   Extremities: No edema or cyanosis   Neuro: ..alert, oriented x3,speech normal in context and clarity, cranial nerves II-XII intact,motor strength: full proximally and distally,gait: normal  reflexes: full and symmetric     Physical exam otherwise negative         Assessment/Plan:     Diagnoses and all orders for this visit:    Essential hypertension  -     COLLECTION VENOUS BLOOD,VENIPUNCTURE  -     CBC WITH AUTOMATED DIFF; Future  -     METABOLIC PANEL, COMPREHENSIVE; Future  -     URINALYSIS W/ REFLEX CULTURE; Future    Mixed hyperlipidemia  -     LIPID PANEL; Future  -     TSH 3RD GENERATION; Future    Long-term use of high-risk medication  -     CK; Future    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 9)    Anxiety    Gastroesophageal reflux disease without esophagitis        Other instructions: The patient's medications were reviewed and reconciled. No change in his current medical regimen will be made.     A no added salt, prudent diet is encouraged    Consider start of once daily blood sugar checks. Patient is up-to-date on Covid19 vaccination. Await results of multiple labs    Follow-up in 6 months    Follow-up and Dispositions    · Return in about 6 months (around 1/6/2022). Melody Russell MD    Please note that this dictation was completed with Epuramat, the computer voice recognition software. Quite often unanticipated grammatical, syntax, homophones, and other interpretive errors are inadvertently transcribed by the computer software. Please disregard these errors. Please excuse any errors that have escaped final proofreading.

## 2021-07-06 NOTE — PROGRESS NOTES
Disha Carty is a 64 y.o. male presenting for Follow Up Chronic Condition (6 mo fu)  . 1. Have you been to the ER, urgent care clinic since your last visit? Hospitalized since your last visit? No    2. Have you seen or consulted any other health care providers outside of the [address] [place] [address] since your last visit? Include any pap smears or colon screening. No    No flowsheet data found. No flowsheet data found. 3 most recent PHQ Screens 1/6/2021   Little interest or pleasure in doing things Not at all   Feeling down, depressed, irritable, or hopeless Not at all   Total Score PHQ 2 0       There are no discontinued medications.

## 2021-07-09 LAB
ALBUMIN SERPL-MCNC: 4.5 G/DL (ref 3.5–5)
ALBUMIN/GLOB SERPL: 1.4 {RATIO} (ref 1.1–2.2)
ALP SERPL-CCNC: 83 U/L (ref 45–117)
ALT SERPL-CCNC: 30 U/L (ref 12–78)
ANION GAP SERPL CALC-SCNC: 7 MMOL/L (ref 5–15)
APPEARANCE UR: CLEAR
AST SERPL-CCNC: 22 U/L (ref 15–37)
BACTERIA URNS QL MICRO: NEGATIVE /HPF
BASOPHILS # BLD: 0.1 K/UL (ref 0–0.1)
BASOPHILS NFR BLD: 1 % (ref 0–1)
BILIRUB SERPL-MCNC: 0.7 MG/DL (ref 0.2–1)
BILIRUB UR QL: NEGATIVE
BUN SERPL-MCNC: 16 MG/DL (ref 6–20)
BUN/CREAT SERPL: 16 (ref 12–20)
CALCIUM SERPL-MCNC: 9.6 MG/DL (ref 8.5–10.1)
CHLORIDE SERPL-SCNC: 106 MMOL/L (ref 97–108)
CHOLEST SERPL-MCNC: 112 MG/DL
CK SERPL-CCNC: 125 U/L (ref 39–308)
CO2 SERPL-SCNC: 29 MMOL/L (ref 21–32)
COLOR UR: ABNORMAL
CREAT SERPL-MCNC: 1.01 MG/DL (ref 0.7–1.3)
DIFFERENTIAL METHOD BLD: ABNORMAL
EOSINOPHIL # BLD: 0.4 K/UL (ref 0–0.4)
EOSINOPHIL NFR BLD: 5 % (ref 0–7)
EPITH CASTS URNS QL MICRO: ABNORMAL /LPF
ERYTHROCYTE [DISTWIDTH] IN BLOOD BY AUTOMATED COUNT: 13 % (ref 11.5–14.5)
EST. AVERAGE GLUCOSE BLD GHB EST-MCNC: 128 MG/DL
GLOBULIN SER CALC-MCNC: 3.2 G/DL (ref 2–4)
GLUCOSE SERPL-MCNC: 108 MG/DL (ref 65–100)
GLUCOSE UR STRIP.AUTO-MCNC: >1000 MG/DL
HBA1C MFR BLD: 6.1 % (ref 4–5.6)
HCT VFR BLD AUTO: 48.6 % (ref 36.6–50.3)
HDLC SERPL-MCNC: 36 MG/DL
HDLC SERPL: 3.1 {RATIO} (ref 0–5)
HGB BLD-MCNC: 15.4 G/DL (ref 12.1–17)
HGB UR QL STRIP: NEGATIVE
HYALINE CASTS URNS QL MICRO: ABNORMAL /LPF (ref 0–5)
IMM GRANULOCYTES # BLD AUTO: 0.1 K/UL (ref 0–0.04)
IMM GRANULOCYTES NFR BLD AUTO: 1 % (ref 0–0.5)
KETONES UR QL STRIP.AUTO: NEGATIVE MG/DL
LDLC SERPL CALC-MCNC: 46.8 MG/DL (ref 0–100)
LEUKOCYTE ESTERASE UR QL STRIP.AUTO: NEGATIVE
LYMPHOCYTES # BLD: 1.9 K/UL (ref 0.8–3.5)
LYMPHOCYTES NFR BLD: 23 % (ref 12–49)
MCH RBC QN AUTO: 29.1 PG (ref 26–34)
MCHC RBC AUTO-ENTMCNC: 31.7 G/DL (ref 30–36.5)
MCV RBC AUTO: 91.7 FL (ref 80–99)
MONOCYTES # BLD: 0.6 K/UL (ref 0–1)
MONOCYTES NFR BLD: 8 % (ref 5–13)
NEUTS SEG # BLD: 5.1 K/UL (ref 1.8–8)
NEUTS SEG NFR BLD: 62 % (ref 32–75)
NITRITE UR QL STRIP.AUTO: NEGATIVE
NRBC # BLD: 0 K/UL (ref 0–0.01)
NRBC BLD-RTO: 0 PER 100 WBC
PH UR STRIP: 5 [PH] (ref 5–8)
PLATELET # BLD AUTO: 230 K/UL (ref 150–400)
PMV BLD AUTO: 10.4 FL (ref 8.9–12.9)
POTASSIUM SERPL-SCNC: 4.2 MMOL/L (ref 3.5–5.1)
PROT SERPL-MCNC: 7.7 G/DL (ref 6.4–8.2)
PROT UR STRIP-MCNC: NEGATIVE MG/DL
RBC # BLD AUTO: 5.3 M/UL (ref 4.1–5.7)
RBC #/AREA URNS HPF: ABNORMAL /HPF (ref 0–5)
SODIUM SERPL-SCNC: 142 MMOL/L (ref 136–145)
SP GR UR REFRACTOMETRY: 1.02 (ref 1–1.03)
TRIGL SERPL-MCNC: 146 MG/DL (ref ?–150)
TSH SERPL DL<=0.05 MIU/L-ACNC: 2.67 UIU/ML (ref 0.36–3.74)
UA: UC IF INDICATED,UAUC: ABNORMAL
UROBILINOGEN UR QL STRIP.AUTO: 0.2 EU/DL (ref 0.2–1)
VLDLC SERPL CALC-MCNC: 29.2 MG/DL
WBC # BLD AUTO: 8.2 K/UL (ref 4.1–11.1)
WBC URNS QL MICRO: ABNORMAL /HPF (ref 0–4)

## 2021-07-27 DIAGNOSIS — F41.9 ANXIETY: Chronic | ICD-10-CM

## 2021-07-27 RX ORDER — GLIPIZIDE 10 MG/1
TABLET, FILM COATED, EXTENDED RELEASE ORAL
Qty: 60 TABLET | Refills: 0 | Status: SHIPPED | OUTPATIENT
Start: 2021-07-27 | End: 2021-08-26

## 2021-07-27 RX ORDER — BUSPIRONE HYDROCHLORIDE 5 MG/1
TABLET ORAL
Qty: 90 TABLET | Refills: 0 | Status: SHIPPED | OUTPATIENT
Start: 2021-07-27 | End: 2021-08-26

## 2021-08-22 RX ORDER — PANTOPRAZOLE SODIUM 40 MG/1
TABLET, DELAYED RELEASE ORAL
Qty: 60 TABLET | Refills: 0 | Status: SHIPPED | OUTPATIENT
Start: 2021-08-22 | End: 2021-09-21

## 2021-08-22 RX ORDER — EMPAGLIFLOZIN 10 MG/1
TABLET, FILM COATED ORAL
Qty: 30 TABLET | Refills: 0 | Status: SHIPPED | OUTPATIENT
Start: 2021-08-22 | End: 2021-09-21

## 2021-08-26 DIAGNOSIS — F41.9 ANXIETY: Chronic | ICD-10-CM

## 2021-08-26 RX ORDER — BUSPIRONE HYDROCHLORIDE 5 MG/1
TABLET ORAL
Qty: 90 TABLET | Refills: 0 | Status: SHIPPED | OUTPATIENT
Start: 2021-08-26 | End: 2021-09-26

## 2021-08-26 RX ORDER — GLIPIZIDE 10 MG/1
TABLET, FILM COATED, EXTENDED RELEASE ORAL
Qty: 60 TABLET | Refills: 0 | Status: SHIPPED | OUTPATIENT
Start: 2021-08-26 | End: 2021-09-26

## 2021-09-21 RX ORDER — EMPAGLIFLOZIN 10 MG/1
TABLET, FILM COATED ORAL
Qty: 30 TABLET | Refills: 0 | Status: SHIPPED | OUTPATIENT
Start: 2021-09-21 | End: 2021-10-21

## 2021-09-21 RX ORDER — PANTOPRAZOLE SODIUM 40 MG/1
TABLET, DELAYED RELEASE ORAL
Qty: 60 TABLET | Refills: 0 | Status: SHIPPED | OUTPATIENT
Start: 2021-09-21 | End: 2021-10-21

## 2021-09-25 DIAGNOSIS — F41.9 ANXIETY: Chronic | ICD-10-CM

## 2021-09-26 RX ORDER — BUSPIRONE HYDROCHLORIDE 5 MG/1
TABLET ORAL
Qty: 90 TABLET | Refills: 0 | Status: SHIPPED | OUTPATIENT
Start: 2021-09-26 | End: 2021-10-25

## 2021-09-26 RX ORDER — GLIPIZIDE 10 MG/1
TABLET, FILM COATED, EXTENDED RELEASE ORAL
Qty: 60 TABLET | Refills: 0 | Status: SHIPPED | OUTPATIENT
Start: 2021-09-26 | End: 2021-10-25

## 2021-10-21 RX ORDER — PANTOPRAZOLE SODIUM 40 MG/1
TABLET, DELAYED RELEASE ORAL
Qty: 60 TABLET | Refills: 0 | Status: SHIPPED | OUTPATIENT
Start: 2021-10-21 | End: 2021-11-20

## 2021-10-21 RX ORDER — EMPAGLIFLOZIN 10 MG/1
TABLET, FILM COATED ORAL
Qty: 30 TABLET | Refills: 0 | Status: SHIPPED | OUTPATIENT
Start: 2021-10-21 | End: 2021-11-20

## 2021-10-25 DIAGNOSIS — F41.9 ANXIETY: Chronic | ICD-10-CM

## 2021-10-25 RX ORDER — BUSPIRONE HYDROCHLORIDE 5 MG/1
TABLET ORAL
Qty: 90 TABLET | Refills: 0 | Status: SHIPPED | OUTPATIENT
Start: 2021-10-25 | End: 2021-11-23

## 2021-10-25 RX ORDER — GLIPIZIDE 10 MG/1
TABLET, FILM COATED, EXTENDED RELEASE ORAL
Qty: 60 TABLET | Refills: 0 | Status: SHIPPED | OUTPATIENT
Start: 2021-10-25 | End: 2021-11-23

## 2021-11-11 DIAGNOSIS — E78.2 MIXED HYPERLIPIDEMIA: Chronic | ICD-10-CM

## 2021-11-11 RX ORDER — METFORMIN HYDROCHLORIDE 500 MG/1
TABLET, EXTENDED RELEASE ORAL
Qty: 120 TABLET | Refills: 0 | Status: SHIPPED | OUTPATIENT
Start: 2021-11-11 | End: 2021-12-10

## 2021-11-11 RX ORDER — ATORVASTATIN CALCIUM 40 MG/1
TABLET, FILM COATED ORAL
Qty: 30 TABLET | Refills: 0 | Status: SHIPPED | OUTPATIENT
Start: 2021-11-11 | End: 2021-12-10

## 2021-11-20 RX ORDER — EMPAGLIFLOZIN 10 MG/1
TABLET, FILM COATED ORAL
Qty: 30 TABLET | Refills: 0 | Status: SHIPPED | OUTPATIENT
Start: 2021-11-20 | End: 2021-12-21

## 2021-11-20 RX ORDER — PANTOPRAZOLE SODIUM 40 MG/1
TABLET, DELAYED RELEASE ORAL
Qty: 60 TABLET | Refills: 0 | Status: SHIPPED | OUTPATIENT
Start: 2021-11-20 | End: 2021-12-21

## 2021-11-23 DIAGNOSIS — F41.9 ANXIETY: Chronic | ICD-10-CM

## 2021-11-23 RX ORDER — GLIPIZIDE 10 MG/1
TABLET, FILM COATED, EXTENDED RELEASE ORAL
Qty: 60 TABLET | Refills: 0 | Status: SHIPPED | OUTPATIENT
Start: 2021-11-23 | End: 2021-12-22

## 2021-11-23 RX ORDER — BUSPIRONE HYDROCHLORIDE 5 MG/1
TABLET ORAL
Qty: 90 TABLET | Refills: 0 | Status: SHIPPED | OUTPATIENT
Start: 2021-11-23 | End: 2021-12-22

## 2021-12-21 RX ORDER — EMPAGLIFLOZIN 10 MG/1
TABLET, FILM COATED ORAL
Qty: 30 TABLET | Refills: 0 | Status: SHIPPED | OUTPATIENT
Start: 2021-12-21 | End: 2022-01-20

## 2021-12-21 RX ORDER — PANTOPRAZOLE SODIUM 40 MG/1
TABLET, DELAYED RELEASE ORAL
Qty: 60 TABLET | Refills: 0 | Status: SHIPPED | OUTPATIENT
Start: 2021-12-21 | End: 2022-01-20

## 2021-12-22 DIAGNOSIS — F41.9 ANXIETY: Chronic | ICD-10-CM

## 2021-12-22 RX ORDER — GLIPIZIDE 10 MG/1
TABLET, FILM COATED, EXTENDED RELEASE ORAL
Qty: 60 TABLET | Refills: 0 | Status: SHIPPED | OUTPATIENT
Start: 2021-12-22 | End: 2022-01-25 | Stop reason: SDUPTHER

## 2021-12-22 RX ORDER — BUSPIRONE HYDROCHLORIDE 5 MG/1
TABLET ORAL
Qty: 90 TABLET | Refills: 0 | Status: SHIPPED | OUTPATIENT
Start: 2021-12-22 | End: 2022-01-25 | Stop reason: SDUPTHER

## 2022-01-07 ENCOUNTER — OFFICE VISIT (OUTPATIENT)
Dept: INTERNAL MEDICINE CLINIC | Age: 57
End: 2022-01-07
Payer: MEDICAID

## 2022-01-07 VITALS
DIASTOLIC BLOOD PRESSURE: 70 MMHG | RESPIRATION RATE: 18 BRPM | HEART RATE: 68 BPM | HEIGHT: 75 IN | OXYGEN SATURATION: 98 % | BODY MASS INDEX: 33.1 KG/M2 | WEIGHT: 266.2 LBS | TEMPERATURE: 98.1 F | SYSTOLIC BLOOD PRESSURE: 126 MMHG

## 2022-01-07 DIAGNOSIS — Z79.899 LONG-TERM USE OF HIGH-RISK MEDICATION: Chronic | ICD-10-CM

## 2022-01-07 DIAGNOSIS — E66.9 OBESITY (BMI 30-39.9): Chronic | ICD-10-CM

## 2022-01-07 DIAGNOSIS — K21.9 GASTROESOPHAGEAL REFLUX DISEASE WITHOUT ESOPHAGITIS: Chronic | ICD-10-CM

## 2022-01-07 DIAGNOSIS — E78.2 MIXED HYPERLIPIDEMIA: Chronic | ICD-10-CM

## 2022-01-07 DIAGNOSIS — I10 ESSENTIAL HYPERTENSION: Chronic | ICD-10-CM

## 2022-01-07 DIAGNOSIS — F41.9 ANXIETY: Chronic | ICD-10-CM

## 2022-01-07 DIAGNOSIS — Z86.73 HISTORY OF CEREBELLAR STROKE: Chronic | ICD-10-CM

## 2022-01-07 DIAGNOSIS — E11.3299 TYPE 2 DIABETES MELLITUS WITH MILD NONPROLIFERATIVE RETINOPATHY, WITHOUT LONG-TERM CURRENT USE OF INSULIN, MACULAR EDEMA PRESENCE UNSPECIFIED, UNSPECIFIED LATERALITY (HCC): Primary | Chronic | ICD-10-CM

## 2022-01-07 LAB
ALBUMIN SERPL-MCNC: 5 G/DL (ref 3.5–5)
ALBUMIN/GLOB SERPL: 1.6 {RATIO} (ref 1.1–2.2)
ALP SERPL-CCNC: 92 U/L (ref 45–117)
ALT SERPL-CCNC: 33 U/L (ref 12–78)
ANION GAP SERPL CALC-SCNC: 4 MMOL/L (ref 5–15)
APPEARANCE UR: CLEAR
AST SERPL-CCNC: 26 U/L (ref 15–37)
BACTERIA URNS QL MICRO: NEGATIVE /HPF
BASOPHILS # BLD: 0.1 K/UL (ref 0–0.1)
BASOPHILS NFR BLD: 1 % (ref 0–1)
BILIRUB SERPL-MCNC: 0.7 MG/DL (ref 0.2–1)
BILIRUB UR QL: NEGATIVE
BUN SERPL-MCNC: 19 MG/DL (ref 6–20)
BUN/CREAT SERPL: 17 (ref 12–20)
CALCIUM SERPL-MCNC: 10 MG/DL (ref 8.5–10.1)
CHLORIDE SERPL-SCNC: 106 MMOL/L (ref 97–108)
CHOLEST SERPL-MCNC: 126 MG/DL
CK SERPL-CCNC: 281 U/L (ref 39–308)
CO2 SERPL-SCNC: 30 MMOL/L (ref 21–32)
COLOR UR: ABNORMAL
CREAT SERPL-MCNC: 1.15 MG/DL (ref 0.7–1.3)
CREAT UR-MCNC: 161 MG/DL
DIFFERENTIAL METHOD BLD: ABNORMAL
EOSINOPHIL # BLD: 0.4 K/UL (ref 0–0.4)
EOSINOPHIL NFR BLD: 4 % (ref 0–7)
EPITH CASTS URNS QL MICRO: ABNORMAL /LPF
ERYTHROCYTE [DISTWIDTH] IN BLOOD BY AUTOMATED COUNT: 13.2 % (ref 11.5–14.5)
EST. AVERAGE GLUCOSE BLD GHB EST-MCNC: 143 MG/DL
GLOBULIN SER CALC-MCNC: 3.2 G/DL (ref 2–4)
GLUCOSE SERPL-MCNC: 118 MG/DL (ref 65–100)
GLUCOSE UR STRIP.AUTO-MCNC: >1000 MG/DL
HBA1C MFR BLD: 6.6 % (ref 4–5.6)
HCT VFR BLD AUTO: 50.4 % (ref 36.6–50.3)
HDLC SERPL-MCNC: 38 MG/DL
HDLC SERPL: 3.3 {RATIO} (ref 0–5)
HGB BLD-MCNC: 16.2 G/DL (ref 12.1–17)
HGB UR QL STRIP: NEGATIVE
IMM GRANULOCYTES # BLD AUTO: 0 K/UL (ref 0–0.04)
IMM GRANULOCYTES NFR BLD AUTO: 0 % (ref 0–0.5)
KETONES UR QL STRIP.AUTO: ABNORMAL MG/DL
LDLC SERPL CALC-MCNC: 60.2 MG/DL (ref 0–100)
LEUKOCYTE ESTERASE UR QL STRIP.AUTO: NEGATIVE
LYMPHOCYTES # BLD: 2.1 K/UL (ref 0.8–3.5)
LYMPHOCYTES NFR BLD: 25 % (ref 12–49)
MCH RBC QN AUTO: 29.8 PG (ref 26–34)
MCHC RBC AUTO-ENTMCNC: 32.1 G/DL (ref 30–36.5)
MCV RBC AUTO: 92.8 FL (ref 80–99)
MICROALBUMIN UR-MCNC: 0.93 MG/DL
MICROALBUMIN/CREAT UR-RTO: 6 MG/G (ref 0–30)
MONOCYTES # BLD: 0.6 K/UL (ref 0–1)
MONOCYTES NFR BLD: 8 % (ref 5–13)
NEUTS SEG # BLD: 5.1 K/UL (ref 1.8–8)
NEUTS SEG NFR BLD: 62 % (ref 32–75)
NITRITE UR QL STRIP.AUTO: NEGATIVE
NRBC # BLD: 0 K/UL (ref 0–0.01)
NRBC BLD-RTO: 0 PER 100 WBC
PH UR STRIP: 5 [PH] (ref 5–8)
PLATELET # BLD AUTO: 230 K/UL (ref 150–400)
PMV BLD AUTO: 10.2 FL (ref 8.9–12.9)
POTASSIUM SERPL-SCNC: 4.8 MMOL/L (ref 3.5–5.1)
PROT SERPL-MCNC: 8.2 G/DL (ref 6.4–8.2)
PROT UR STRIP-MCNC: NEGATIVE MG/DL
RBC # BLD AUTO: 5.43 M/UL (ref 4.1–5.7)
RBC #/AREA URNS HPF: ABNORMAL /HPF (ref 0–5)
SODIUM SERPL-SCNC: 140 MMOL/L (ref 136–145)
SP GR UR REFRACTOMETRY: 1.02 (ref 1–1.03)
TRIGL SERPL-MCNC: 139 MG/DL (ref ?–150)
TSH SERPL DL<=0.05 MIU/L-ACNC: 1.59 UIU/ML (ref 0.36–3.74)
UA: UC IF INDICATED,UAUC: ABNORMAL
UROBILINOGEN UR QL STRIP.AUTO: 1 EU/DL (ref 0.2–1)
VLDLC SERPL CALC-MCNC: 27.8 MG/DL
WBC # BLD AUTO: 8.3 K/UL (ref 4.1–11.1)
WBC URNS QL MICRO: ABNORMAL /HPF (ref 0–4)

## 2022-01-07 PROCEDURE — 99214 OFFICE O/P EST MOD 30 MIN: CPT | Performed by: INTERNAL MEDICINE

## 2022-01-07 NOTE — PATIENT INSTRUCTIONS
Learning About Carbohydrate (Carb) Counting and Eating Out When You Have Diabetes  Why plan your meals? Meal planning can be a key part of managing diabetes. Planning meals and snacks with the right balance of carbohydrate, protein, and fat can help you keep your blood sugar at the target level you set with your doctor. You don't have to eat special foods. You can eat what your family eats, including sweets once in a while. But you do have to pay attention to how often you eat and how much you eat of certain foods. You may want to work with a dietitian or a certified diabetes educator. He or she can give you tips and meal ideas and can answer your questions about meal planning. This health professional can also help you reach a healthy weight if that is one of your goals. What should you know about eating carbs? Managing the amount of carbohydrate (carbs) you eat is an important part of healthy meals when you have diabetes. Carbohydrate is found in many foods. · Learn which foods have carbs. And learn the amounts of carbs in different foods. ? Bread, cereal, pasta, and rice have about 15 grams of carbs in a serving. A serving is 1 slice of bread (1 ounce), ½ cup of cooked cereal, or 1/3 cup of cooked pasta or rice. ? Fruits have 15 grams of carbs in a serving. A serving is 1 small fresh fruit, such as an apple or orange; ½ of a banana; ½ cup of cooked or canned fruit; ½ cup of fruit juice; 1 cup of melon or raspberries; or 2 tablespoons of dried fruit. ? Milk and no-sugar-added yogurt have 15 grams of carbs in a serving. A serving is 1 cup of milk or 2/3 cup of no-sugar-added yogurt. ? Starchy vegetables have 15 grams of carbs in a serving. A serving is ½ cup of mashed potatoes or sweet potato; 1 cup winter squash; ½ of a small baked potato; ½ cup of cooked beans; or ½ cup cooked corn or green peas.   · Learn how much carbs to eat each day and at each meal. A dietitian or CDE can teach you how to keep track of the amount of carbs you eat. This is called carbohydrate counting. · If you are not sure how to count carbohydrate grams, use the Plate Method to plan meals. It is a good, quick way to make sure that you have a balanced meal. It also helps you spread carbs throughout the day. ? Divide your plate by types of foods. Put non-starchy vegetables on half the plate, meat or other protein food on one-quarter of the plate, and a grain or starchy vegetable in the final quarter of the plate. To this you can add a small piece of fruit and 1 cup of milk or yogurt, depending on how many carbs you are supposed to eat at a meal.  · Try to eat about the same amount of carbs at each meal. Do not \"save up\" your daily allowance of carbs to eat at one meal.  · Proteins have very little or no carbs per serving. Examples of proteins are beef, chicken, turkey, fish, eggs, tofu, cheese, cottage cheese, and peanut butter. A serving size of meat is 3 ounces, which is about the size of a deck of cards. Examples of meat substitute serving sizes (equal to 1 ounce of meat) are 1/4 cup of cottage cheese, 1 egg, 1 tablespoon of peanut butter, and ½ cup of tofu. How can you eat out and still eat healthy? · Learn to estimate the serving sizes of foods that have carbohydrate. If you measure food at home, it will be easier to estimate the amount in a serving of restaurant food. · If the meal you order has too much carbohydrate (such as potatoes, corn, or baked beans), ask to have a low-carbohydrate food instead. Ask for a salad or green vegetables. · If you use insulin, check your blood sugar before and after eating out to help you plan how much to eat in the future. · If you eat more carbohydrate at a meal than you had planned, take a walk or do other exercise. This will help lower your blood sugar. What are some tips for eating healthy? · Limit saturated fat, such as the fat from meat and dairy products.  This is a healthy choice because people who have diabetes are at higher risk of heart disease. So choose lean cuts of meat and nonfat or low-fat dairy products. Use olive or canola oil instead of butter or shortening when cooking. · Don't skip meals. Your blood sugar may drop too low if you skip meals and take insulin or certain medicines for diabetes. · Check with your doctor before you drink alcohol. Alcohol can cause your blood sugar to drop too low. Alcohol can also cause a bad reaction if you take certain diabetes medicines. Follow-up care is a key part of your treatment and safety. Be sure to make and go to all appointments, and call your doctor if you are having problems. It's also a good idea to know your test results and keep a list of the medicines you take. Where can you learn more? Go to http://www.elder.com/  Enter I147 in the search box to learn more about \"Learning About Carbohydrate (Carb) Counting and Eating Out When You Have Diabetes. \"  Current as of: December 17, 2020               Content Version: 13.0  © 2006-2021 Healthwise, Incorporated. Care instructions adapted under license by [employer] (which disclaims liability or warranty for this information). If you have questions about a medical condition or this instruction, always ask your healthcare professional. [address] any warranty or liability for your use of this information.

## 2022-01-07 NOTE — PROGRESS NOTES
Shiloh Barrett is a 64 y.o. male presenting for Follow Up Chronic Condition (6 m fu)  . 1. Have you been to the ER, urgent care clinic since your last visit? Hospitalized since your last visit? No    2. Have you seen or consulted any other health care providers outside of the [address] [place] [address] since your last visit? Include any pap smears or colon screening. Eye Dr- Oct 2021    No flowsheet data found. No flowsheet data found. 3 most recent PHQ Screens 1/7/2022   Little interest or pleasure in doing things Not at all   Feeling down, depressed, irritable, or hopeless Not at all   Total Score PHQ 2 0       There are no discontinued medications.

## 2022-01-07 NOTE — PROGRESS NOTES
Subjective:     Mr. Babs Mendoza returns to the office today in follow-up of multiple medical problems. The patient has anxiety for which she is on BuSpar. He notes that this continues to work effectively for him. He has had no breakthrough symptoms or long-term side effects related to his medication. He remains on glipizide, Jardiance and metformin for his type 2 diabetes mellitus. Diabetes has been complicated by retinopathy. He does not check his blood sugars. He is up-to-date on diabetic retinal eye exam.  Denies any burning paresthesias of his feet. Hypercholesterolemia is currently managed on Lipitor. He denies muscle soreness or GI upset. The patient does have a prior history of cerebellar stroke and does remain on a baby aspirin daily. He denies exertional chest pains, claudication or symptoms. He remains on PPI therapy for GERD. He denies breakthrough heartburn and has had no dysphagia, early satiety or unexplained weight loss.     Past Medical History:   Diagnosis Date    Diabetes ([address]     Essential hypertension     Hyperlipidemia     Long-term use of high-risk medication 3/26/2019    Stroke ([address] 01/2018    balance     Past Surgical History:   Procedure Laterality Date    COLONOSCOPY N/A 11/13/2019    COLONOSCOPY performed by Luis Carlos Sarmiento [facility] at [facility] AMBULATORY [facility]     No Known Allergies  Current Outpatient Medications   Medication Sig Dispense Refill    busPIRone (BUSPAR) 5 mg tablet TAKE 1 TABLET BY MOUTH THREE TIMES DAILY WITH MEALS 90 Tablet 0    glipiZIDE SR (GLUCOTROL XL) 10 mg CR tablet Take 1 tablet by mouth twice daily 60 Tablet 0    pantoprazole (PROTONIX) 40 mg tablet Take 1 tablet by mouth twice daily 60 Tablet 0    Jardiance 10 mg tablet Take 1 tablet by mouth once daily 30 Tablet 0    atorvastatin (LIPITOR) 40 mg tablet Take 1 tablet by mouth once daily 30 Tablet 3    metFORMIN ER (GLUCOPHAGE XR) 500 mg tablet Take 2 tablets by mouth twice daily 120 Tablet 3    aspirin delayed-release 81 mg tablet Take  by mouth daily. Social History     Socioeconomic History    Marital status: SINGLE   Occupational History    Occupation: [employer]   Tobacco Use    Smoking status: Never Smoker    Smokeless tobacco: Current User     Types: Snuff    Tobacco comment: snuff   Vaping Use    Vaping Use: Never used   Substance and Sexual Activity    Alcohol use: No    Drug use: No     Family History   Problem Relation Age of Onset    Coronary Art Dis Maternal Grandmother     Coronary Art Dis Paternal Grandmother     Cancer Mother         vulvar    Diabetes Father        Review of Systems:  GEN: no weight loss, weight gain, fatigue or night sweats  CV: no PND, orthopnea, or palpitations  Resp: no dyspnea on exertion, no cough  Abd: no nausea, vomiting or diarrhea  EXT: denies edema, claudication  Endocrine: no hair loss, excessive thirst or polyuria  Neurological ROS: no TIA or stroke symptoms  ROS otherwise negative      Objective:     Visit Vitals  /70 (BP 1 Location: Left upper arm, BP Patient Position: Sitting, BP Cuff Size: Adult)   Pulse 68   Temp 98.1 °F (36.7 °C) (Oral)   Resp 18   Ht 6' 3\" (1.905 m)   Wt 266 lb 3.2 oz (120.7 kg)   SpO2 98%   BMI 33.27 kg/m²     Body mass index is 33.27 kg/m². General:   alert, cooperative and no distress   Eyes: conjunctivae/sclerae clear. PERRL, EOM's intact   Mouth:  No oral lesions, no pharyngeal erythema, no exudates   Neck: Trachea midline, no thyromegaly, no bruits   Heart: S1 and S2 normal,no murmurs noted    Lungs: Clear to auscultation bilaterally, no increased work of breathing   Abdomen: Soft, nontender.   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, without long-term current use of insulin, macular edema presence unspecified, unspecified laterality (HCC)  -     HEMOGLOBIN A1C WITH EAG; Future  -     MICROALBUMIN, UR, RAND W/ MICROALB/CREAT RATIO; Future    Essential hypertension  -     COLLECTION VENOUS BLOOD,VENIPUNCTURE  -     CBC WITH AUTOMATED DIFF; Future  -     METABOLIC PANEL, COMPREHENSIVE; Future  -     URINALYSIS W/ REFLEX CULTURE; Future    Mixed hyperlipidemia  -     LIPID PANEL; Future  -     TSH 3RD GENERATION; Future    Long-term use of high-risk medication  -     CK; Future    History of cerebellar stroke    Gastroesophageal reflux disease without esophagitis    Anxiety    Obesity (BMI 30-39. 9)        Other instructions: The patient's medications were reviewed and reconciled. No change in his current medical regimen will be made. A no added salt, prudent diet is encouraged    In addition to once daily blood sugar checks    Weight loss recommended with body mass index of 33.3    Await results of multiple labs    Follow-up in 6 months    Follow-up and Dispositions    · Return in about 6 months (around 7/7/2022). Elena Lucas MD    Please note that this dictation was completed with Coiney, the [employer] voice recognition software. Quite often unanticipated grammatical, syntax, homophones, and other interpretive errors are inadvertently transcribed by the computer software. Please disregard these errors. Please excuse any errors that have escaped final proofreading.

## 2022-01-20 RX ORDER — PANTOPRAZOLE SODIUM 40 MG/1
TABLET, DELAYED RELEASE ORAL
Qty: 60 TABLET | Refills: 0 | Status: SHIPPED | OUTPATIENT
Start: 2022-01-20 | End: 2022-02-18

## 2022-01-20 RX ORDER — EMPAGLIFLOZIN 10 MG/1
TABLET, FILM COATED ORAL
Qty: 30 TABLET | Refills: 0 | Status: SHIPPED | OUTPATIENT
Start: 2022-01-20 | End: 2022-02-18

## 2022-01-25 DIAGNOSIS — F41.9 ANXIETY: Chronic | ICD-10-CM

## 2022-01-25 RX ORDER — BUSPIRONE HYDROCHLORIDE 5 MG/1
5 TABLET ORAL
Qty: 90 TABLET | Refills: 2 | Status: SHIPPED | OUTPATIENT
Start: 2022-01-25 | End: 2022-04-26 | Stop reason: SDUPTHER

## 2022-01-25 RX ORDER — GLIPIZIDE 10 MG/1
10 TABLET, FILM COATED, EXTENDED RELEASE ORAL 2 TIMES DAILY
Qty: 60 TABLET | Refills: 2 | Status: SHIPPED | OUTPATIENT
Start: 2022-01-25 | End: 2022-04-25 | Stop reason: SDUPTHER

## 2022-01-25 NOTE — TELEPHONE ENCOUNTER
Requested Prescriptions     Pending Prescriptions Disp Refills    glipiZIDE SR (GLUCOTROL XL) 10 mg CR tablet 60 Tablet 2     Sig: Take 1 Tablet by mouth two (2) times a day.     busPIRone (BUSPAR) 5 mg tablet 90 Tablet 2       Last Refill 12/22/21:   Next Appointment:7/19/22

## 2022-02-18 RX ORDER — PANTOPRAZOLE SODIUM 40 MG/1
TABLET, DELAYED RELEASE ORAL
Qty: 60 TABLET | Refills: 0 | Status: SHIPPED | OUTPATIENT
Start: 2022-02-18 | End: 2022-03-19

## 2022-02-18 RX ORDER — EMPAGLIFLOZIN 10 MG/1
TABLET, FILM COATED ORAL
Qty: 30 TABLET | Refills: 0 | Status: SHIPPED | OUTPATIENT
Start: 2022-02-18 | End: 2022-03-19

## 2022-03-18 PROBLEM — E78.5 HYPERLIPIDEMIA: Status: ACTIVE | Noted: 2019-03-05

## 2022-03-18 PROBLEM — E11.319 DIABETES WITH RETINOPATHY (HCC): Status: ACTIVE | Noted: 2019-03-05

## 2022-03-19 PROBLEM — I10 ESSENTIAL HYPERTENSION: Status: ACTIVE | Noted: 2019-03-05

## 2022-03-19 PROBLEM — E66.9 OBESITY (BMI 30-39.9): Status: ACTIVE | Noted: 2019-03-05

## 2022-03-19 PROBLEM — Z79.899 LONG-TERM USE OF HIGH-RISK MEDICATION: Status: ACTIVE | Noted: 2019-03-26

## 2022-03-19 PROBLEM — Z86.73 HISTORY OF CEREBELLAR STROKE: Status: ACTIVE | Noted: 2019-03-05

## 2022-03-19 PROBLEM — K21.9 GASTROESOPHAGEAL REFLUX DISEASE WITHOUT ESOPHAGITIS: Status: ACTIVE | Noted: 2021-01-06

## 2022-03-19 RX ORDER — EMPAGLIFLOZIN 10 MG/1
TABLET, FILM COATED ORAL
Qty: 30 TABLET | Refills: 0 | Status: SHIPPED | OUTPATIENT
Start: 2022-03-19 | End: 2022-04-18 | Stop reason: SDUPTHER

## 2022-03-19 RX ORDER — PANTOPRAZOLE SODIUM 40 MG/1
TABLET, DELAYED RELEASE ORAL
Qty: 60 TABLET | Refills: 0 | Status: SHIPPED | OUTPATIENT
Start: 2022-03-19 | End: 2022-04-18 | Stop reason: SDUPTHER

## 2022-03-20 PROBLEM — F41.9 ANXIETY: Status: ACTIVE | Noted: 2020-07-01

## 2022-04-10 DIAGNOSIS — E78.2 MIXED HYPERLIPIDEMIA: Chronic | ICD-10-CM

## 2022-04-11 RX ORDER — ATORVASTATIN CALCIUM 40 MG/1
TABLET, FILM COATED ORAL
Qty: 30 TABLET | Refills: 0 | Status: SHIPPED | OUTPATIENT
Start: 2022-04-11 | End: 2022-05-10 | Stop reason: SDUPTHER

## 2022-04-11 RX ORDER — METFORMIN HYDROCHLORIDE 500 MG/1
TABLET, EXTENDED RELEASE ORAL
Qty: 120 TABLET | Refills: 0 | Status: SHIPPED | OUTPATIENT
Start: 2022-04-11 | End: 2022-05-10 | Stop reason: SDUPTHER

## 2022-04-11 NOTE — TELEPHONE ENCOUNTER
PCP: Janina Cordoba MD    Last appt: 7/1/2020  Future Appointments   Date Time Provider Brian Bronson   7/19/2022 10:00 AM Mortimer Clay, MD [facility] BS AMB       Requested Prescriptions     Pending Prescriptions Disp Refills    atorvastatin (LIPITOR) 40 mg tablet [Pharmacy Med Name: Atorvastatin Calcium 40 MG Oral Tablet] 30 Tablet 0     Sig: Take 1 tablet by mouth once daily    metFORMIN ER (GLUCOPHAGE XR) 500 mg tablet [Pharmacy Med Name: metFORMIN HCl  MG Oral Tablet Extended Release 24 Hour] 120 Tablet 0     Sig: Take 2 tablets by mouth twice daily       Prior labs and Blood pressures:  BP Readings from Last 3 Encounters:   01/07/22 126/70   07/06/21 120/76   01/06/21 122/78     Lab Results   Component Value Date/Time    Sodium 140 01/07/2022 10:27 AM    Potassium 4.8 01/07/2022 10:27 AM    Chloride 106 01/07/2022 10:27 AM    CO2 30 01/07/2022 10:27 AM    Anion gap 4 (L) 01/07/2022 10:27 AM    Glucose 118 (H) 01/07/2022 10:27 AM    BUN 19 01/07/2022 10:27 AM    Creatinine 1.15 01/07/2022 10:27 AM    BUN/Creatinine ratio 17 01/07/2022 10:27 AM    GFR est AA >60 01/07/2022 10:27 AM    GFR est non-AA >60 01/07/2022 10:27 AM    Calcium 10.0 01/07/2022 10:27 AM     Lab Results   Component Value Date/Time    Hemoglobin A1c 6.6 (H) 01/07/2022 10:27 AM     Lab Results   Component Value Date/Time    Cholesterol, total 126 01/07/2022 10:27 AM    HDL Cholesterol 38 01/07/2022 10:27 AM    LDL, calculated 60.2 01/07/2022 10:27 AM    VLDL, calculated 27.8 01/07/2022 10:27 AM    Triglyceride 139 01/07/2022 10:27 AM    CHOL/HDL Ratio 3.3 01/07/2022 10:27 AM     No results found for: Sloane Herring, [id number]    Lab Results   Component Value Date/Time    TSH 1.59 01/07/2022 10:27 AM    TSH, 3rd generation 2.76 01/06/2021 09:22 AM

## 2022-04-18 RX ORDER — PANTOPRAZOLE SODIUM 40 MG/1
40 TABLET, DELAYED RELEASE ORAL 2 TIMES DAILY
Qty: 180 TABLET | Refills: 0 | Status: SHIPPED | OUTPATIENT
Start: 2022-04-18 | End: 2022-07-18 | Stop reason: SDUPTHER

## 2022-04-18 NOTE — TELEPHONE ENCOUNTER
PCP: Harini Bonilla MD    Last appt: 1/7/2022  Future Appointments   Date Time Provider Brian Bronson   7/19/2022 10:00 AM Robert Alcaraz MD [facility] BS AMB       Requested Prescriptions     Pending Prescriptions Disp Refills    pantoprazole (PROTONIX) 40 mg tablet 60 Tablet 0     Sig: Take 1 Tablet by mouth two (2) times a day.  empagliflozin (Jardiance) 10 mg tablet 30 Tablet 0     Sig: Take 1 Tablet by mouth daily.        Prior labs and Blood pressures:  BP Readings from Last 3 Encounters:   01/07/22 126/70   07/06/21 120/76   01/06/21 122/78     Lab Results   Component Value Date/Time    Sodium 140 01/07/2022 10:27 AM    Potassium 4.8 01/07/2022 10:27 AM    Chloride 106 01/07/2022 10:27 AM    CO2 30 01/07/2022 10:27 AM    Anion gap 4 (L) 01/07/2022 10:27 AM    Glucose 118 (H) 01/07/2022 10:27 AM    BUN 19 01/07/2022 10:27 AM    Creatinine 1.15 01/07/2022 10:27 AM    BUN/Creatinine ratio 17 01/07/2022 10:27 AM    GFR est AA >60 01/07/2022 10:27 AM    GFR est non-AA >60 01/07/2022 10:27 AM    Calcium 10.0 01/07/2022 10:27 AM     Lab Results   Component Value Date/Time    Hemoglobin A1c 6.6 (H) 01/07/2022 10:27 AM     Lab Results   Component Value Date/Time    Cholesterol, total 126 01/07/2022 10:27 AM    HDL Cholesterol 38 01/07/2022 10:27 AM    LDL, calculated 60.2 01/07/2022 10:27 AM    VLDL, calculated 27.8 01/07/2022 10:27 AM    Triglyceride 139 01/07/2022 10:27 AM    CHOL/HDL Ratio 3.3 01/07/2022 10:27 AM     No results found for: BRYSON Dai    Lab Results   Component Value Date/Time    TSH 1.59 01/07/2022 10:27 AM    TSH, 3rd generation 2.76 01/06/2021 09:22 AM

## 2022-04-25 RX ORDER — GLIPIZIDE 10 MG/1
10 TABLET, FILM COATED, EXTENDED RELEASE ORAL 2 TIMES DAILY
Qty: 60 TABLET | Refills: 2 | Status: SHIPPED | OUTPATIENT
Start: 2022-04-25 | End: 2022-04-26 | Stop reason: SDUPTHER

## 2022-04-25 NOTE — TELEPHONE ENCOUNTER
PCP: Arun Ortiz MD    Last appt: 1/7/2022  Future Appointments   Date Time Provider Brian Bronson   7/19/2022 10:00 AM Zara Causey MD [facility] AMB       Requested Prescriptions     Pending Prescriptions Disp Refills    glipiZIDE SR (GLUCOTROL XL) 10 mg CR tablet 60 Tablet 2     Sig: Take 1 Tablet by mouth two (2) times a day.        Prior labs and Blood pressures:  BP Readings from Last 3 Encounters:   01/07/22 126/70   07/06/21 120/76   01/06/21 122/78     Lab Results   Component Value Date/Time    Sodium 140 01/07/2022 10:27 AM    Potassium 4.8 01/07/2022 10:27 AM    Chloride 106 01/07/2022 10:27 AM    CO2 30 01/07/2022 10:27 AM    Anion gap 4 (L) 01/07/2022 10:27 AM    Glucose 118 (H) 01/07/2022 10:27 AM    BUN 19 01/07/2022 10:27 AM    Creatinine 1.15 01/07/2022 10:27 AM    BUN/Creatinine ratio 17 01/07/2022 10:27 AM    GFR est AA >60 01/07/2022 10:27 AM    GFR est non-AA >60 01/07/2022 10:27 AM    Calcium 10.0 01/07/2022 10:27 AM     Lab Results   Component Value Date/Time    Hemoglobin A1c 6.6 (H) 01/07/2022 10:27 AM     Lab Results   Component Value Date/Time    Cholesterol, total 126 01/07/2022 10:27 AM    HDL Cholesterol 38 01/07/2022 10:27 AM    LDL, calculated 60.2 01/07/2022 10:27 AM    VLDL, calculated 27.8 01/07/2022 10:27 AM    Triglyceride 139 01/07/2022 10:27 AM    CHOL/HDL Ratio 3.3 01/07/2022 10:27 AM     No results found for: Maximiliano Peguero [id number]    Lab Results   Component Value Date/Time    TSH 1.59 01/07/2022 10:27 AM    TSH, 3rd generation 2.76 01/06/2021 09:22 AM

## 2022-04-26 DIAGNOSIS — F41.9 ANXIETY: Chronic | ICD-10-CM

## 2022-04-26 RX ORDER — BUSPIRONE HYDROCHLORIDE 5 MG/1
5 TABLET ORAL
Qty: 90 TABLET | Refills: 2 | Status: SHIPPED | OUTPATIENT
Start: 2022-04-26 | End: 2022-07-27 | Stop reason: SDUPTHER

## 2022-04-26 RX ORDER — GLIPIZIDE 10 MG/1
10 TABLET, FILM COATED, EXTENDED RELEASE ORAL 2 TIMES DAILY
Qty: 60 TABLET | Refills: 2 | Status: SHIPPED | OUTPATIENT
Start: 2022-04-26

## 2022-04-26 NOTE — TELEPHONE ENCOUNTER
PCP: Eliana Butts MD    Last appt: 1/7/2022  Future Appointments   Date Time Provider Brian Bronson   7/19/2022 10:00 AM Renee Tomas MD [facility] AMB       Requested Prescriptions     Pending Prescriptions Disp Refills    glipiZIDE SR (GLUCOTROL XL) 10 mg CR tablet 60 Tablet 2     Sig: Take 1 Tablet by mouth two (2) times a day.  busPIRone (BUSPAR) 5 mg tablet 90 Tablet 2     Sig: Take 1 Tablet by mouth three (3) times daily (with meals).          Other Comments:

## 2022-05-10 DIAGNOSIS — E78.2 MIXED HYPERLIPIDEMIA: Chronic | ICD-10-CM

## 2022-05-10 RX ORDER — METFORMIN HYDROCHLORIDE 500 MG/1
TABLET, EXTENDED RELEASE ORAL
Qty: 120 TABLET | Refills: 0 | Status: SHIPPED | OUTPATIENT
Start: 2022-05-10 | End: 2022-06-10

## 2022-05-10 RX ORDER — ATORVASTATIN CALCIUM 40 MG/1
TABLET, FILM COATED ORAL
Qty: 30 TABLET | Refills: 0 | Status: SHIPPED | OUTPATIENT
Start: 2022-05-10 | End: 2022-06-10

## 2022-07-15 PROBLEM — Z86.73 HISTORY OF STROKE: Status: ACTIVE | Noted: 2018-01-01

## 2022-07-15 PROBLEM — I63.9 STROKE (HCC): Status: ACTIVE | Noted: 2018-01-01

## 2022-07-15 NOTE — PROGRESS NOTES
Subjective:     Chief Complaint   Patient presents with   April 8 is a 62 y.o. M. He has a past medical history of diabetes, hypertension, and a previous history of stroke. I reviewed the medical record. The patient was seen most recently in January by his previous primary care provider. He was noted to be using BuSpar for anxiety, which was working well for him at the time. His diabetes was felt to be reasonably controlled with use of glipizide, Jardiance, and metformin. His diabetes was noted to have been complicated by development of retinopathy. Lipitor was being used for his hypercholesterolemia. Physical examination at the time was notable for morbid obesity with a BMI of 33.3 kg/m². No changes were made to his medication regimen, and he was referred for routine laboratory studies. Today, the patient comes in for routine preventive care, establishment, and follow-up on his chronic medical concerns. He reports feeling generally well overall today and has no significant acute somatic complaints. Since his last visit in January there has been no significant change in his overall clinical status. He continues on the same medications as before without any recent changes. He continues on metformin, glipizide, and Jardiance for his history of diabetes mellitus. He reports tolerating the medication well without any difficulties such as diarrhea, hypoglycemic episodes, polyuria, polydipsia, or urinary tract infections. He does not check his blood glucose readings regularly at home. I reviewed the results of the patient's most recent A1c with him at bedside today, which indicated overall pretty good control at 6.6%. He is due for foot examination today. He has a history of hypercholesterolemia, for which he uses Lipitor 40 mg daily. He reports tolerating the medication well without any myalgias or GI symptoms.   He has a prior history of cerebellar CVA as noted above, and continues on baby aspirin as well. He denies having had any recent recurrent symptoms similar to his previous CVA or any new focal neurological symptoms. He also denies having had any recent chest pain, shortness of breath, or any further cardiopulmonary symptoms. He continues on buspirone as needed for his anxiety, and reports that this is overall effective for him. He denies any suicidal or homicidal ideation, insomnia, or other symptoms. Routine Healthcare Maintenance issues are reviewed and discussed with the patient as noted below. Orders to update gaps in healthcare maintenance were placed as noted below in the Assessment and Plan, where applicable. He is up-to-date with regard to most routine screening measures. He notes that he had recently received a notification that he was due for both his Shingrix and tetanus boosters. He is willing to get these today. He reports being up-to-date with regard to his COVID-19 vaccination. He continues to use smokeless tobacco.     Past Medical History:  Past Medical History:   Diagnosis Date    Anxiety     Diabetes ([facility] [address]     Essential hypertension     History of nuclear stress test 06/2020    LVEF 68%, no ischemia    History of stroke 01/2018    balance    Hyperlipidemia     Long-term use of high-risk medication 3/26/2019    Smokeless tobacco use        Past Surgical Histor:  Past Surgical History:   Procedure Laterality Date    COLONOSCOPY N/A 11/13/2019    COLONOSCOPY performed by Niall Jimenez MD at [facility]       Allergies:  No Known Allergies    Medications:  Current Outpatient Medications   Medication Sig Dispense Refill    pantoprazole (PROTONIX) 40 mg tablet Take 1 tablet by mouth twice daily 180 Tablet 0    Jardiance 10 mg tablet Take 1 tablet by mouth once daily 90 Tablet 0    metFORMIN ER (GLUCOPHAGE XR) 500 mg tablet Take 2 Tablets by mouth two (2) times a day for 360 days.  360 Tablet 3    atorvastatin (LIPITOR) 40 mg tablet Take 1 Tablet by mouth daily for 360 days. 90 Tablet 3    glipiZIDE SR (GLUCOTROL XL) 10 mg CR tablet Take 1 Tablet by mouth two (2) times a day. 60 Tablet 2    busPIRone (BUSPAR) 5 mg tablet Take 1 Tablet by mouth three (3) times daily (with meals). 90 Tablet 2    aspirin delayed-release 81 mg tablet Take  by mouth daily. Social History:  Social History     Socioeconomic History    Marital status: SINGLE   Occupational History    Occupation: [employer]   Tobacco Use    Smoking status: Never Smoker    Smokeless tobacco: Current User     Types: Snuff    Tobacco comment: snuff   Vaping Use    Vaping Use: Never used   Substance and Sexual Activity    Alcohol use: No    Drug use: No       Family History:  Family History   Problem Relation Age of Onset    Coronary Art Dis Maternal Grandmother     Coronary Art Dis Paternal Grandmother     Cancer Mother         vulvar    Diabetes Father        Immunizations:  Immunization History   Administered Date(s) Administered    Influenza Vaccine 10/01/2020, 10/23/2021    Influenza Vaccine (Quad) PF (>6 Mo Flulaval, Fluarix, and >3 Yrs Afluria, Fluzone [id number]) 03/07/2020    Pneumococcal Polysaccharide (PPSV-23) 10/23/2021        Healthcare Maintenance:  Health Maintenance   Topic Date Due    COVID-19 Vaccine (1) Never done    DTaP/Tdap/Td series (1 - Tdap) Never done    Shingrix Vaccine Age 50> (1 of 2) Never done    Flu Vaccine (1) 09/01/2022    Pneumococcal 0-64 years (2 - PCV) 10/23/2022    A1C test (Diabetic or Prediabetic)  01/07/2023    MICROALBUMIN Q1  01/07/2023    Lipid Screen  01/07/2023    Depression Screen  07/19/2023    Foot Exam Q1  07/19/2023    Eye Exam Retinal or Dilated  10/18/2023    Colorectal Cancer Screening Combo  11/13/2029    Hepatitis C Screening  Completed        Review of Systems:  ROS:  Review of Systems   Constitutional: Negative. HENT: Negative. Eyes: Negative. Respiratory: Negative. Cardiovascular: Negative. Gastrointestinal: Negative. Genitourinary: Negative. Musculoskeletal: Negative. Skin: Negative. Neurological: Negative. Endo/Heme/Allergies: Negative. Psychiatric/Behavioral: Negative. ROS otherwise negative      Objective:     Vital Signs:  Visit Vitals  /79 (BP 1 Location: Right arm, BP Patient Position: Sitting, BP Cuff Size: Large adult)   Pulse 73   Temp 98.1 °F (36.7 °C) (Oral)   Resp 18   Ht 6' 3\" (1.905 m)   Wt 266 lb 12.8 oz (121 kg)   SpO2 96%   BMI 33.35 kg/m²       BMI:  Body mass index is 33.35 kg/m². Physical Examination:  Physical Exam  Vitals reviewed. Constitutional:       Appearance: Normal appearance. He is obese. HENT:      Head: Normocephalic and atraumatic. Nose: Nose normal.      Mouth/Throat:      Mouth: Mucous membranes are moist.   Eyes:      Extraocular Movements: Extraocular movements intact. Conjunctiva/sclera: Conjunctivae normal.      Pupils: Pupils are equal, round, and reactive to light. Cardiovascular:      Rate and Rhythm: Normal rate and regular rhythm. Pulses: Normal pulses. Dorsalis pedis pulses are 2+ on the right side and 2+ on the left side. Posterior tibial pulses are 2+ on the right side and 2+ on the left side. Heart sounds: Normal heart sounds. No murmur heard. No friction rub. No gallop. Pulmonary:      Effort: Pulmonary effort is normal. No respiratory distress. Breath sounds: Normal breath sounds. No wheezing, rhonchi or rales. Abdominal:      General: Bowel sounds are normal. There is no distension. Palpations: Abdomen is soft. There is no mass. Tenderness: There is no abdominal tenderness. There is no guarding or rebound. Musculoskeletal:         General: No tenderness, deformity or signs of injury. Normal range of motion. Cervical back: Normal range of motion and neck supple. Right lower leg: No edema.       Left lower leg: No edema.      Right foot: No deformity or foot drop. Left foot: No deformity or foot drop. Feet:      Right foot:      Protective Sensation: 5 sites tested. 5 sites sensed. Skin integrity: Skin integrity normal. No ulcer or skin breakdown. Toenail Condition: Right toenails are abnormally thick and long. Left foot:      Protective Sensation: 5 sites tested. 5 sites sensed. Skin integrity: Skin integrity normal. No ulcer or skin breakdown. Toenail Condition: Left toenails are abnormally thick and long. Skin:     General: Skin is warm and dry. Findings: No bruising, lesion or rash. Neurological:      General: No focal deficit present. Mental Status: He is alert and oriented to person, place, and time. Mental status is at baseline. Cranial Nerves: No cranial nerve deficit. Sensory: No sensory deficit. Motor: No weakness. Coordination: Coordination normal.      Gait: Gait normal.      Deep Tendon Reflexes: Reflexes normal.   Psychiatric:         Mood and Affect: Mood normal.         Behavior: Behavior normal.          Physical exam otherwise negative    Diagnostic Testing:    Laboratory Studies:  Office Visit on 01/07/2022   Component Date Value Ref Range Status    Microalbumin,urine random 01/07/2022 0.93  MG/DL Final    No reference range has been established.  Creatinine, urine 01/07/2022 161.00  mg/dL Final    No reference range has been established.     Microalbumin/Creat ratio (mg/g cre* 01/07/2022 6  0 - 30 mg/g Final    Color 01/07/2022 YELLOW/STRAW    Final    Color Reference Range: Straw, Yellow or Dark Yellow    Appearance 01/07/2022 CLEAR  CLEAR   Final    Specific gravity 01/07/2022 1.025  1.003 - 1.030   Final    pH (UA) 01/07/2022 5.0  5.0 - 8.0   Final    Protein 01/07/2022 Negative  Negative mg/dL Final    Glucose 01/07/2022 >1,000* Negative mg/dL Final    Ketone 01/07/2022 TRACE* Negative mg/dL Final    Bilirubin 01/07/2022 Negative  Negative   Final    Blood 01/07/2022 Negative  Negative   Final    Urobilinogen 01/07/2022 1.0  0.2 - 1.0 EU/dL Final    Nitrites 01/07/2022 Negative  Negative   Final    Leukocyte Esterase 01/07/2022 Negative  Negative   Final    WBC 01/07/2022 0-4  0 - 4 /hpf Final    RBC 01/07/2022 0-5  0 - 5 /hpf Final    Epithelial cells 01/07/2022 FEW  FEW /lpf Final    Comment: Epithelial cell category consists of squamous cells and /or transitional  urothelial cells. Renal tubular cells, if present, are separately identified as  such.  Bacteria 01/07/2022 Negative  Negative /hpf Final    UA:UC IF INDICATED 01/07/2022 CULTURE NOT INDICATED BY UA RESULT  CULTURE NOT INDICATED BY UA RESULT   Final    TSH 01/07/2022 1.59  0.36 - 3.74 uIU/mL Final    Comment:   Due to TSH heterogeneity, both structurally and degree of glycosylation,  monoclonal antibodies used in the TSH assay may not accurately quantitate TSH. Therefore, this result should be correlated with clinical findings as well as  with other assessments of thyroid function, e.g., free T4, free T3.  Sodium 01/07/2022 140  136 - 145 mmol/L Final    Potassium 01/07/2022 4.8  3.5 - 5.1 mmol/L Final    Chloride 01/07/2022 106  97 - 108 mmol/L Final    CO2 01/07/2022 30  21 - 32 mmol/L Final    Anion gap 01/07/2022 4* 5 - 15 mmol/L Final    Glucose 01/07/2022 118* 65 - 100 mg/dL Final    BUN 01/07/2022 19  6 - 20 MG/DL Final    Creatinine 01/07/2022 1.15  0.70 - 1.30 MG/DL Final    BUN/Creatinine ratio 01/07/2022 17  12 - 20   Final    GFR est AA 01/07/2022 >60  >60 ml/min/1.73m2 Final    GFR est non-AA 01/07/2022 >60  >60 ml/min/1.73m2 Final    Comment: Estimated GFR is calculated using the IDMS-traceable Modification of Diet in  Renal Disease (MDRD) Study equation, reported for both  Americans  (GFRAA) and non- Americans (GFRNA), and normalized to 1.73m2 body  surface area.  The physician must decide which value applies to the patient.  Calcium 01/07/2022 10.0  8.5 - 10.1 MG/DL Final    Bilirubin, total 01/07/2022 0.7  0.2 - 1.0 MG/DL Final    ALT (SGPT) 01/07/2022 33  12 - 78 U/L Final    AST (SGOT) 01/07/2022 26  15 - 37 U/L Final    Alk. phosphatase 01/07/2022 92  45 - 117 U/L Final    Protein, total 01/07/2022 8.2  6.4 - 8.2 g/dL Final    Albumin 01/07/2022 5.0  3.5 - 5.0 g/dL Final    Globulin 01/07/2022 3.2  2.0 - 4.0 g/dL Final    A-G Ratio 01/07/2022 1.6  1.1 - 2.2   Final    Cholesterol, total 01/07/2022 126  <200 MG/DL Final    Triglyceride 01/07/2022 139  <150 MG/DL Final    Comment: Based on NCEP-ATP III:  Triglycerides <150 mg/dL  is considered normal, 150-199  mg/dL  borderline high,  200-499 mg/dL high and  greater than or equal to 500  mg/dL very high.  HDL Cholesterol 01/07/2022 38  MG/DL Final    Comment: Based on NCEP ATP III, HDL Cholesterol <40 mg/dL is considered low and >60  mg/dL is elevated.       LDL, calculated 01/07/2022 60.2  0 - 100 MG/DL Final    Comment: Based on the NCEP-ATP: LDL-C concentrations are considered  optimal <100 mg/dL,  near optimal/above Normal 100-129 mg/dL Borderline High: 130-159, High: 160-189  mg/dL Very High: Greater than or equal to 190 mg/dL      VLDL, calculated 01/07/2022 27.8  MG/DL Final    CHOL/HDL Ratio 01/07/2022 3.3  0.0 - 5.0   Final    Hemoglobin A1c 01/07/2022 6.6* 4.0 - 5.6 % Final    Comment: NEW METHOD PLEASE NOTE NEW REFERENCE RANGE  (NOTE)  HbA1C Interpretive Ranges  <5.7              Normal  5.7 - 6.4         Consider Prediabetes  >6.5              Consider Diabetes      Est. average glucose 01/07/2022 143  mg/dL Final    CK 01/07/2022 281  39 - 308 U/L Final    WBC 01/07/2022 8.3  4.1 - 11.1 K/uL Final    RBC 01/07/2022 5.43  4.10 - 5.70 M/uL Final    HGB 01/07/2022 16.2  12.1 - 17.0 g/dL Final    HCT 01/07/2022 50.4* 36.6 - 50.3 % Final    MCV 01/07/2022 92.8  80.0 - 99.0 FL Final    MCH 01/07/2022 29.8  26.0 - 34.0 PG Final    MCHC 01/07/2022 32.1  30.0 - 36.5 g/dL Final    RDW 01/07/2022 13.2  11.5 - 14.5 % Final    PLATELET 34/25/4197 162  150 - 400 K/uL Final    MPV 01/07/2022 10.2  8.9 - 12.9 FL Final    NRBC 01/07/2022 0.0  0  WBC Final    ABSOLUTE NRBC 01/07/2022 0.00  0.00 - 0.01 K/uL Final    NEUTROPHILS 01/07/2022 62  32 - 75 % Final    LYMPHOCYTES 01/07/2022 25  12 - 49 % Final    MONOCYTES 01/07/2022 8  5 - 13 % Final    EOSINOPHILS 01/07/2022 4  0 - 7 % Final    BASOPHILS 01/07/2022 1  0 - 1 % Final    IMMATURE GRANULOCYTES 01/07/2022 0  0.0 - 0.5 % Final    ABS. NEUTROPHILS 01/07/2022 5.1  1.8 - 8.0 K/UL Final    ABS. LYMPHOCYTES 01/07/2022 2.1  0.8 - 3.5 K/UL Final    ABS. MONOCYTES 01/07/2022 0.6  0.0 - 1.0 K/UL Final    ABS. EOSINOPHILS 01/07/2022 0.4  0.0 - 0.4 K/UL Final    ABS. BASOPHILS 01/07/2022 0.1  0.0 - 0.1 K/UL Final    ABS. IMM. GRANS. 01/07/2022 0.0  0.00 - 0.04 K/UL Final    DF 01/07/2022 AUTOMATED    Final         Radiographic Studies:  XR Results (most recent):  Results from Hospital Encounter encounter on 05/24/20    XR CHEST PA LAT    Narrative  Indication:  3 weeks intermittent chest pain, normal exam    Exam: PA and lateral views of the chest.    Direct comparison is made to prior CXR dated April 2012. Findings: Cardiomediastinal silhouette is within normal limits. Lungs are clear  bilaterally. Pleural spaces are normal. Osseous structures are intact. Impression  IMPRESSION: No acute cardiopulmonary disease. NANCY Results (most recent):  No results found for this or any previous visit. CT Results (most recent):  Results from Hospital Encounter encounter on 01/31/18    CT HEAD WO CONT    Narrative  INDICATION: Vertigo, persistent, central    EXAM: CT HEAD without contrast.  CT dose reduction was achieved through use of a standardized protocol tailored  for this examination and automatic exposure control for dose modulation. FINDINGS: Unenhanced CT Head is performed. The brain parenchyma is unremarkable  in appearance for age, without evidence for infarct. There is no bleed, mass,  shift, hydrocephalus or extra-axial fluid collection. Bone windows are  unremarkable. Impression  IMPRESSION: No Intracranial Disease Evident on Head CT. DEXA Results (most recent):  No results found for this or any previous visit. MRI Results (most recent):  No results found for this or any previous visit. Assessment/Plan:       ICD-10-CM ICD-9-CM    1. Routine adult health maintenance  H38.68 K62.8 METABOLIC PANEL, COMPREHENSIVE   2. Mixed hyperlipidemia  E78.2 272.2    3. Anxiety  F41.9 300.00    4. 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 History of cerebellar stroke  Z86.73 V12.54    6. Obesity (BMI 30-39. 9)  E66.9 278.00    7. Essential hypertension  I10 401.9    8. Encounter for immunization  Z23 V03.89 TDAP, BOOSTRIX, (AGE 10 YRS+), IM      ZOSTER, SHINGRIX, (18 YRS +), IM          Healthcare Maintenance:  - Preventive measures are reviewed as per above  - Up to date on routine interventions except as noted above  - Orders placed to update gaps as noted  - Notes: Tdap, Shingrix boosters provided today. Otherwise up-to-date as noted. Foot examination completed today. History of CVA:   - Current Symptoms: none   - History and Contributing Factors: diabetes, elevated cholesterol and hypertension  Key CAD CHF Meds             atorvastatin (LIPITOR) 40 mg tablet (Taking) Take 1 Tablet by mouth daily for 360 days. aspirin delayed-release 81 mg tablet (Taking) Take  by mouth daily. - Target BP: < 130/80 mmHg; see Blood Pressure section   - Statin? YES   - Antiplatelet and/or Anticoagulant? YES   - ACEI/ARB? NO   - Beta Blocker? NO   - Notes: Asymptomatic overall.   Tolerating current therapy as noted below. Continuing with current care, continue with aspirin. Diabetes Mellitus:   - Type: Type 2 Diabetes   - Current A1C:   Lab Results   Component Value Date/Time    Hemoglobin A1c 6.6 (H) 01/07/2022 10:27 AM       - Target A1C: Less than 6.5 to 7%   - Complications: cerebrovascular disease   - Relevant Diabetes Medications:  Key Antihyperglycemic Medications             Jardiance 10 mg tablet (Taking) Take 1 tablet by mouth once daily    metFORMIN ER (GLUCOPHAGE XR) 500 mg tablet (Taking) Take 2 Tablets by mouth two (2) times a day for 360 days. glipiZIDE SR (GLUCOTROL XL) 10 mg CR tablet (Taking) Take 1 Tablet by mouth two (2) times a day. empagliflozin (Jardiance) 10 mg tablet (Discontinued) Take 1 Tablet by mouth daily.            - General Recommendations discussed today: diabetic diet discussed in detail, written exchange diet given, low cholesterol diet, weight control and daily exercise discussed, all medications, side effects and compliance discussed carefully, foot care discussed and Podiatry visits discussed, annual eye examinations at Ophthalmology discussed, glycohemoglobin and other lab monitoring discussed and long term diabetic complications discussed   - Notes: Patient has podiatrist, and he is encouraged to follow-up with them given the state of his nails. However, his foot examination today is unremarkable. He appears to be tolerating his current therapy. We discussed Trulicity at length today, noting the mechanism of action and the potential benefits with regard to weight loss. I noted the patient that his current glipizide would probably inhibit his efforts at weight loss. Rechecking A1c, consider adding on Trulicity if needed. Patient is precontemplative regarding a change or substitution of the glipizide for Trulicity at this time. Deferring additional changes for now pending repeat A1c.         Essential Hypertension/Blood Pressure Management:   - Home BP Readings: not doing   - Current Control: optimal   - Target BP: Less than 130/80 mmHg   - Relevant BP Meds:  Key CAD CHF Meds             atorvastatin (LIPITOR) 40 mg tablet (Taking) Take 1 Tablet by mouth daily for 360 days. aspirin delayed-release 81 mg tablet (Taking) Take  by mouth daily.             - Plan: on no meds for BP and needs none at this time, dietary sodium restriction, regular aerobic exercise, weight loss   - Notes: ---      Hyperlipidemia/Dyslipidemia:   - Summary of Cardiovascular Risks and Goals:     LDL goal is under 80  diabetic  hypertension  hyperlipidemia  prior CVA/TIA or known carotid artery disease   -   Lab Results   Component Value Date/Time    LDL, calculated 60.2 01/07/2022 10:27 AM    HDL Cholesterol 38 01/07/2022 10:27 AM       - Relevant Cholesterol Meds:  Key Antihyperlipidemia Meds             atorvastatin (LIPITOR) 40 mg tablet (Taking) Take 1 Tablet by mouth daily for 360 days. - Cholesterol at target: yes   - Does patient meet USPSTF and ACC/AHA indications for pharmacotherapy (e.g., statin): yes   - GI symptoms with meds: NO   - Muscle aches with meds: NO   - Other Adverse effects with meds: NO   - Medication Plan: continue   - Notes: ---      Obesity:   - Body mass index is 33.35 kg/m². - Current Obesity Medications (if any):   Key Obesity Meds             metFORMIN ER (GLUCOPHAGE XR) 500 mg tablet (Taking) Take 2 Tablets by mouth two (2) times a day for 360 days.          - Discussed therapeutic lifestyle changes: dietary modifications, caloric restriction, increased aerobic exercise, resistance training   - Patient adherent to dietary modifications x 6 months (e.g., Mediterranean Diet): NO   - Previous Nutrition Referral or formal weight loss plan x 6 m: NO   - Comorbidities:   - Obstructive sleep apnea: NO   - Obesity-related hypoventilation:NO    - Diabetes Mellitus:YES   - Hypertension:YES   - Other (GERD, OA, back pain):NO   - Qualifies for medication or bariatric surgery referral? NO   - Notes: consider GLP1a but defer for now    Anxiety/Depression:   - Current PHQ: No data recorded    - Current RX:   Key Psychotherapeutic Meds             busPIRone (BUSPAR) 5 mg tablet (Taking) Take 1 Tablet by mouth three (3) times daily (with meals). - Psychology/Psychiatry Co-managing? No   -Good control of symptoms with as needed use with buspirone, continuing with current care, consider adding on SSRI in the future but deferring for now given his otherwise asymptomatic status without red flag symptoms. I have reviewed the patient's medical history in detail and updated the computerized patient record. We had a prolonged discussion about these complex clinical issues and went over the various important aspects to consider. All questions were answered. Advised the patient to call back or return to office if symptoms do not improve, change in nature, or persist.     The patient was given an after visit summary or informed of [facility] which includes patient instructions, diagnoses, current medications, & vitals. he expressed understanding with the diagnosis and plan. Venancio Lara MD    Please note that this dictation was completed with [employer], the computer voice recognition software. Quite often unanticipated grammatical, syntax, homophones, and other interpretive errors are inadvertently transcribed by the computer software. Please disregard these errors. Please excuse any errors that have escaped final proofreading.

## 2022-07-18 RX ORDER — EMPAGLIFLOZIN 10 MG/1
TABLET, FILM COATED ORAL
Qty: 90 TABLET | Refills: 0 | Status: SHIPPED | OUTPATIENT
Start: 2022-07-18 | End: 2022-10-15

## 2022-07-18 RX ORDER — PANTOPRAZOLE SODIUM 40 MG/1
TABLET, DELAYED RELEASE ORAL
Qty: 180 TABLET | Refills: 0 | Status: SHIPPED | OUTPATIENT
Start: 2022-07-18 | End: 2022-10-15

## 2022-07-19 ENCOUNTER — TELEPHONE (OUTPATIENT)
Dept: INTERNAL MEDICINE CLINIC | Age: 57
End: 2022-07-19

## 2022-07-19 ENCOUNTER — OFFICE VISIT (OUTPATIENT)
Dept: INTERNAL MEDICINE CLINIC | Age: 57
End: 2022-07-19
Payer: MEDICAID

## 2022-07-19 VITALS
DIASTOLIC BLOOD PRESSURE: 79 MMHG | HEART RATE: 73 BPM | OXYGEN SATURATION: 96 % | WEIGHT: 266.8 LBS | HEIGHT: 75 IN | SYSTOLIC BLOOD PRESSURE: 131 MMHG | BODY MASS INDEX: 33.17 KG/M2 | TEMPERATURE: 98.1 F | RESPIRATION RATE: 18 BRPM

## 2022-07-19 DIAGNOSIS — E78.2 MIXED HYPERLIPIDEMIA: ICD-10-CM

## 2022-07-19 DIAGNOSIS — F41.9 ANXIETY: ICD-10-CM

## 2022-07-19 DIAGNOSIS — Z86.73 HISTORY OF CEREBELLAR STROKE: ICD-10-CM

## 2022-07-19 DIAGNOSIS — E11.3299 TYPE 2 DIABETES MELLITUS WITH MILD NONPROLIFERATIVE RETINOPATHY, WITHOUT LONG-TERM CURRENT USE OF INSULIN, MACULAR EDEMA PRESENCE UNSPECIFIED, UNSPECIFIED LATERALITY (HCC): ICD-10-CM

## 2022-07-19 DIAGNOSIS — E66.9 OBESITY (BMI 30-39.9): ICD-10-CM

## 2022-07-19 DIAGNOSIS — Z23 ENCOUNTER FOR IMMUNIZATION: ICD-10-CM

## 2022-07-19 DIAGNOSIS — I10 ESSENTIAL HYPERTENSION: ICD-10-CM

## 2022-07-19 DIAGNOSIS — Z00.00 ROUTINE ADULT HEALTH MAINTENANCE: Primary | ICD-10-CM

## 2022-07-19 LAB
ALBUMIN SERPL-MCNC: 4.4 G/DL (ref 3.5–5)
ALBUMIN/GLOB SERPL: 1.5 {RATIO} (ref 1.1–2.2)
ALP SERPL-CCNC: 72 U/L (ref 45–117)
ALT SERPL-CCNC: 35 U/L (ref 12–78)
ANION GAP SERPL CALC-SCNC: 8 MMOL/L (ref 5–15)
AST SERPL-CCNC: 20 U/L (ref 15–37)
BILIRUB SERPL-MCNC: 0.8 MG/DL (ref 0.2–1)
BUN SERPL-MCNC: 20 MG/DL (ref 6–20)
BUN/CREAT SERPL: 18 (ref 12–20)
CALCIUM SERPL-MCNC: 9.3 MG/DL (ref 8.5–10.1)
CHLORIDE SERPL-SCNC: 104 MMOL/L (ref 97–108)
CO2 SERPL-SCNC: 27 MMOL/L (ref 21–32)
CREAT SERPL-MCNC: 1.09 MG/DL (ref 0.7–1.3)
EST. AVERAGE GLUCOSE BLD GHB EST-MCNC: 186 MG/DL
GLOBULIN SER CALC-MCNC: 3 G/DL (ref 2–4)
GLUCOSE SERPL-MCNC: 153 MG/DL (ref 65–100)
HBA1C MFR BLD: 8.1 % (ref 4–5.6)
POTASSIUM SERPL-SCNC: 4 MMOL/L (ref 3.5–5.1)
PROT SERPL-MCNC: 7.4 G/DL (ref 6.4–8.2)
SODIUM SERPL-SCNC: 139 MMOL/L (ref 136–145)

## 2022-07-19 PROCEDURE — 90715 TDAP VACCINE 7 YRS/> IM: CPT | Performed by: INTERNAL MEDICINE

## 2022-07-19 PROCEDURE — 90750 HZV VACC RECOMBINANT IM: CPT | Performed by: INTERNAL MEDICINE

## 2022-07-19 PROCEDURE — 99396 PREV VISIT EST AGE 40-64: CPT | Performed by: INTERNAL MEDICINE

## 2022-07-19 PROCEDURE — 99214 OFFICE O/P EST MOD 30 MIN: CPT | Performed by: INTERNAL MEDICINE

## 2022-07-19 RX ORDER — DULAGLUTIDE 0.75 MG/.5ML
0.75 INJECTION, SOLUTION SUBCUTANEOUS
Qty: 2 ML | Refills: 0 | Status: SHIPPED | OUTPATIENT
Start: 2022-07-19 | End: 2022-08-16

## 2022-07-19 RX ORDER — DULAGLUTIDE 1.5 MG/.5ML
1.5 INJECTION, SOLUTION SUBCUTANEOUS
Qty: 2 ML | Refills: 11 | Status: SHIPPED | OUTPATIENT
Start: 2022-08-16 | End: 2022-08-23 | Stop reason: SDUPTHER

## 2022-07-19 NOTE — PROGRESS NOTES
Notify patient. A1C not at target < 6.5%. Had previously discussed Trulicity. Starting this med; repeat A1C 3 months after starting. Needs appt 3-4 months to discuss tolerability.

## 2022-07-19 NOTE — PROGRESS NOTES
Chief Complaint   Patient presents with    Establish Care     Visit Vitals  /79 (BP 1 Location: Right arm, BP Patient Position: Sitting, BP Cuff Size: Large adult)   Pulse 73   Temp 98.1 °F (36.7 °C) (Oral)   Resp 18   Ht 6' 3\" (1.905 m)   Wt 266 lb 12.8 oz (121 kg)   SpO2 96%   BMI 33.35 kg/m²       1. \"Have you been to the ER, urgent care clinic since your last visit? Hospitalized since your last visit? \" No    2. \"Have you seen or consulted any other health care providers outside of the [address] [place] [address] since your last visit? \" No     3. For patients aged 39-70: Has the patient had a colonoscopy / FIT/ Cologuard? No      If the patient is female:    4. For patients aged 41-77: Has the patient had a mammogram within the past 2 years? No      5. For patients aged 21-65: Has the patient had a pap smear?  No

## 2022-07-19 NOTE — PROGRESS NOTES
After obtaining written consent and per orders of Dr. Kwame Braden, injection of TDAP given by Kevin Thibodeaux CMA. Order and injection/medication verified by Guillermo Curiel. Patient tolerated procedure well. VIS was given to them. No reactions noted. After obtaining written consent and per orders of Dr. Kwame Braden, injection of [facility] given by Kevin Thibodeaux CMA. Order and injection/medication verified by Guillermo Curiel. Patient tolerated procedure well. VIS was given to them. No reactions noted.

## 2022-07-19 NOTE — PATIENT INSTRUCTIONS
Please continue monitoring your blood pressure at home using your home blood pressure monitor. Please record your readings and bring these with you to your next visit. DASH Diet: Care Instructions  Your Care Instructions     The DASH diet is an eating plan that can help lower your blood pressure. DASH stands for Dietary Approaches to Stop Hypertension. Hypertension is high blood pressure. The DASH diet focuses on eating foods that are high in calcium, potassium, and magnesium. These nutrients can lower blood pressure. The foods that are highest in these nutrients are fruits, vegetables, low-fat dairy products, nuts, seeds, and legumes. But taking calcium, potassium, and magnesium supplements instead of eating foods that are high in those nutrients does not have the same effect. The DASH diet also includes whole grains, fish, and poultry. The DASH diet is one of several lifestyle changes your doctor may recommend to lower your high blood pressure. Your doctor may also want you to decrease the amount of sodium in your diet. Lowering sodium while following the DASH diet can lower blood pressure even further than just the DASH diet alone. Follow-up care is a key part of your treatment and safety. Be sure to make and go to all appointments, and call your doctor if you are having problems. It's also a good idea to know your test results and keep a list of the medicines you take. How can you care for yourself at home? Following the DASH diet  · Eat 4 to 5 servings of fruit each day. A serving is 1 medium-sized piece of fruit, ½ cup chopped or canned fruit, 1/4 cup dried fruit, or 4 ounces (½ cup) of fruit juice. Choose fruit more often than fruit juice. · Eat 4 to 5 servings of vegetables each day. A serving is 1 cup of lettuce or raw leafy vegetables, ½ cup of chopped or cooked vegetables, or 4 ounces (½ cup) of vegetable juice. Choose vegetables more often than vegetable juice.   · Get 2 to 3 servings of low-fat and fat-free dairy each day. A serving is 8 ounces of milk, 1 cup of yogurt, or 1 ½ ounces of cheese. · Eat 6 to 8 servings of grains each day. A serving is 1 slice of bread, 1 ounce of dry cereal, or ½ cup of cooked rice, pasta, or cooked cereal. Try to choose whole-grain products as much as possible. · Limit lean meat, poultry, and fish to 2 servings each day. A serving is 3 ounces, about the size of a deck of cards. · Eat 4 to 5 servings of nuts, seeds, and legumes (cooked dried beans, lentils, and split peas) each week. A serving is 1/3 cup of nuts, 2 tablespoons of seeds, or ½ cup of cooked beans or peas. · Limit fats and oils to 2 to 3 servings each day. A serving is 1 teaspoon of vegetable oil or 2 tablespoons of salad dressing. · Limit sweets and added sugars to 5 servings or less a week. A serving is 1 tablespoon jelly or jam, ½ cup sorbet, or 1 cup of lemonade. · Eat less than 2,300 milligrams (mg) of sodium a day. If you limit your sodium to 1,500 mg a day, you can lower your blood pressure even more. · Be aware that all of these are the suggested number of servings for people who eat 1,800 to 2,000 calories a day. Your recommended number of servings may be different if you need more or fewer calories. Tips for success  · Start small. Do not try to make dramatic changes to your diet all at once. You might feel that you are missing out on your favorite foods and then be more likely to not follow the plan. Make small changes, and stick with them. Once those changes become habit, add a few more changes. · Try some of the following:  ? Make it a goal to eat a fruit or vegetable at every meal and at snacks. This will make it easy to get the recommended amount of fruits and vegetables each day. ? Try yogurt topped with fruit and nuts for a snack or healthy dessert. ? Add lettuce, tomato, cucumber, and onion to sandwiches.   ? Combine a ready-made pizza crust with low-fat mozzarella cheese and lots of vegetable toppings. Try using tomatoes, squash, spinach, broccoli, carrots, cauliflower, and onions. ? Have a variety of cut-up vegetables with a low-fat dip as an appetizer instead of chips and dip. ? Sprinkle sunflower seeds or chopped almonds over salads. Or try adding chopped walnuts or almonds to cooked vegetables. ? Try some vegetarian meals using beans and peas. Add garbanzo or kidney beans to salads. Make burritos and tacos with mashed tucker beans or black beans. Where can you learn more? Go to http://www.elder.com/  Enter H967 in the search box to learn more about \"DASH Diet: Care Instructions. \"  Current as of: January 10, 2022               Content Version: 13.2  © 4113-3728 [employer]. Care instructions adapted under license by [employer] (which disclaims liability or warranty for this information). If you have questions about a medical condition or this instruction, always ask your healthcare professional. [address] any warranty or liability for your use of this information. Learning About Carbohydrate (Carb) Counting and Eating Out When You Have Diabetes  Why plan your meals? Meal planning can be a key part of managing diabetes. Planning meals and snacks with the right balance of carbohydrate, protein, and fat can help you keep your blood sugar at the target level you set with your doctor. You don't have to eat special foods. You can eat what your family eats, including sweets once in a while. But you do have to pay attention to how often you eat and how much you eat of certain foods. You may want to work with a dietitian or a diabetes educator. They can give you tips and meal ideas and can answer your questions about meal planning. This health professional can also help you reach a healthy weight if that is one of your goals. What should you know about eating carbs?   Managing the amount of carbohydrate (carbs) you eat is an important part of healthy meals when you have diabetes. Carbohydrate is found in many foods. · Learn which foods have carbs. And learn the amounts of carbs in different foods. ? Bread, cereal, pasta, and rice have about 15 grams of carbs in a serving. A serving is 1 slice of bread (1 ounce), ½ cup of cooked cereal, or 1/3 cup of cooked pasta or rice. ? Fruits have 15 grams of carbs in a serving. A serving is 1 small fresh fruit, such as an apple or orange; ½ of a banana; ½ cup of cooked or canned fruit; ½ cup of fruit juice; 1 cup of melon or raspberries; or 2 tablespoons of dried fruit. ? Milk and no-sugar-added yogurt have 15 grams of carbs in a serving. A serving is 1 cup of milk or 3/4 cup (6 oz) of no-sugar-added yogurt. ? Starchy vegetables have 15 grams of carbs in a serving. A serving is ½ cup of mashed potatoes or sweet potato; 1 cup winter squash; ½ of a small baked potato; ½ cup of cooked beans; or ½ cup cooked corn or green peas. · Learn how much carbs to eat each day and at each meal. A dietitian or certified diabetes educator can teach you how to keep track of the amount of carbs you eat. This is called carbohydrate counting. · If you are not sure how to count carbohydrate grams, use the plate method to plan meals. It is a quick way to make sure that you have a balanced meal. It also can help you manage the amount of carbohydrate you eat at meals. ? Divide your plate by types of foods. Put non-starchy vegetables on half the plate, meat or other protein food on one-quarter of the plate, and a grain or starchy vegetable in the final quarter of the plate. To this you can add a small piece of fruit and 1 cup of milk or yogurt, depending on how many carbs you are supposed to eat at a meal.  · Try to eat about the same amount of carbs at each meal. Do not \"save up\" your daily allowance of carbs to eat at one meal.  · Proteins have very little or no carbs.  Examples of proteins are beef, chicken, turkey, fish, eggs, tofu, cheese, cottage cheese, and peanut butter. How can you eat out and still eat healthy? · Learn to estimate the serving sizes of foods that have carbohydrate. If you measure food at home, it will be easier to estimate the amount in a serving of restaurant food. · If the meal you order has too much carbohydrate (such as potatoes, corn, or baked beans), ask to have a low-carbohydrate food instead. Ask for a salad or non-starchy vegetables like broccoli, cauliflower, green beans, or peppers. · If you eat more carbohydrate at a meal than you had planned, take a walk or do other exercise. This will help lower your blood sugar. What are some tips for eating healthy? · Limit saturated fat, such as the fat from meat and dairy products. This is a healthy choice because people who have diabetes are at higher risk of heart disease. So choose lean cuts of meat and nonfat or low-fat dairy products. Use olive or canola oil instead of butter or shortening when cooking. · Don't skip meals. Your blood sugar may drop too low if you skip meals and take insulin or certain medicines for diabetes. · Check with your doctor before you drink alcohol. Alcohol can cause your blood sugar to drop too low. Alcohol can also cause a bad reaction if you take certain diabetes medicines. Follow-up care is a key part of your treatment and safety. Be sure to make and go to all appointments, and call your doctor if you are having problems. It's also a good idea to know your test results and keep a list of the medicines you take. Where can you learn more? Go to http://ernestina-lidia.info/  Enter I147 in the search box to learn more about \"Learning About Carbohydrate (Carb) Counting and Eating Out When You Have Diabetes. \"  Current as of: September 8, 2021               Content Version: 13.2  © 2473-3793 [employer].    Care instructions adapted under license by [employer] (which disclaims liability or warranty for this information). If you have questions about a medical condition or this instruction, always ask your healthcare professional. [address] any warranty or liability for your use of this information. Dulaglutide: Patient drug information    Copyright 5261-0296 [facility] rights reserved. Contributor Disclosures  (For additional information see \"Dulaglutide: Drug information\")    You must carefully read the \"Consumer Information Use and Disclaimer\" below in order to understand and correctly use this information. Brand Names: [place]  · Trulicity    Brand Names: [place]  · Trulicity    Warning    Drugs like this one have been shown to cause thyroid cancer in some animals. It is not known if this drug may cause thyroid cancer in humans. Call your doctor right away if you have a neck mass, trouble breathing, trouble swallowing, or hoarseness that will not go away. Do not use this drug if you have a health problem called Multiple Endocrine Neoplasia syndrome type 2 (MEN 2), or if you or a family member have had thyroid cancer. What is this drug used for? It is used to lower blood sugar in patients with high blood sugar (diabetes). It is used to lower the chance of heart attack, stroke, and death in some people. What do I need to tell my doctor BEFORE I take this drug? If you are allergic to this drug; any part of this drug; or any other drugs, foods, or substances. Tell your doctor about the allergy and what signs you had. If you have any of these health problems: Type 1 diabetes or stomach or bowel problems. If you have ever had pancreatitis. If the patient is a child. Do not give this drug to a child. This is not a list of all drugs or health problems that interact with this drug.     Tell your doctor and pharmacist about all of your drugs (prescription or OTC, natural products, vitamins) and health problems. You must check to make sure that it is safe for you to take this drug with all of your drugs and health problems. Do not start, stop, or change the dose of any drug without checking with your doctor. What are some things I need to know or do while I take this drug? Tell all of your health care providers that you take this drug. This includes your doctors, nurses, pharmacists, and dentists. Follow the diet and workout plan that your doctor told you about. Wear disease medical alert ID (identification). Check your blood sugar as you have been told by your doctor. Have blood work checked as you have been told by the doctor. Talk with the doctor. Do not drive if your blood sugar has been low. There is a greater chance of you having a crash. It may be harder to control blood sugar during times of stress such as fever, infection, injury, or surgery. A change in physical activity, exercise, or diet may also affect blood sugar. Kidney problems have happened with drugs like this one. Sometimes, kidney problems have needed to be treated in the hospital. Dialysis has also been needed. Talk with your doctor. Tell your doctor if you have upset stomach, throwing up, diarrhea, or too much sweating. Losing too much fluid may raise your chance of kidney problems. If you are dehydrated, talk with your doctor. This drug may prevent other drugs taken by mouth from getting into the body. If you take other drugs by mouth, you may need to take them at some other time than this drug. Talk with your doctor. Do not share pen or cartridge devices with another person even if the needle has been changed. Sharing these devices may pass infections from one person to another. This includes infections you may not know you have. Tell your doctor if you are pregnant, plan on getting pregnant, or are breast-feeding.  You will need to talk about the benefits and risks to you and the baby. What are some side effects that I need to call my doctor about right away? WARNING/CAUTION: Even though it may be rare, some people may have very bad and sometimes deadly side effects when taking a drug. Tell your doctor or get medical help right away if you have any of the following signs or symptoms that may be related to a very bad side effect:    Signs of an allergic reaction, like rash; hives; itching; red, swollen, blistered, or peeling skin with or without fever; wheezing; tightness in the chest or throat; trouble breathing, swallowing, or talking; unusual hoarseness; or swelling of the mouth, face, lips, tongue, or throat. Signs of a pancreas problem (pancreatitis) like very bad stomach pain, very bad back pain, or very bad upset stomach or throwing up. Signs of kidney problems like unable to pass urine, change in how much urine is passed, blood in the urine, or a big weight gain. Change in eyesight. Low blood sugar can happen. The chance may be raised when this drug is used with other drugs for diabetes. Signs may be dizziness, headache, feeling sleepy or weak, shaking, fast heartbeat, confusion, hunger, or sweating. Call your doctor right away if you have any of these signs. Follow what you have been told to do for low blood sugar. This may include taking glucose tablets, liquid glucose, or some fruit juices. What are some other side effects of this drug? All drugs may cause side effects. However, many people have no side effects or only have minor side effects. Call your doctor or get medical help if any of these side effects or any other side effects bother you or do not go away:    Not hungry. Feeling tired or weak. It is common to have diarrhea, upset stomach, throwing up, or stomach pain with this drug. Call your doctor if any of these side effects get very bad, bother you, or do not go away. These are not all of the side effects that may occur.  If you have questions about side effects, call your doctor. Call your doctor for medical advice about side effects. You may report side effects to your national health agency. How is this drug best taken? Use this drug as ordered by your doctor. Read all information given to you. Follow all instructions closely. It is given as a shot into the fatty part of the skin on the top of the thigh, belly area, or upper arm. If you will be giving yourself the shot, your doctor or nurse will teach you how to give the shot. Be sure you know how to use this drug. Read the instructions for use that come with this drug. If there are no instructions for use or you have any questions about how to use this drug, talk with the doctor or pharmacist.    Take with or without food. Drink lots of noncaffeine liquids unless told to drink less liquid by your doctor. Take the same day each week. Do not use if the solution is cloudy, leaking, or has particles. Do not use if solution changes color. Wash your hands before and after use. Move site where you give the shot each time. If you are also using insulin, you may inject this drug and the insulin in the same area of the body but not right next to each other. Do not mix this drug in the same syringe with insulin. Keep taking this drug as you have been told by your doctor or other health care provider, even if you feel well. Throw away needles in a needle/sharp disposal box. Do not reuse needles or other items. When the box is full, follow all local rules for getting rid of it. Talk with a doctor or pharmacist if you have any questions. What do I do if I miss a dose? Take a missed dose as soon as you think about it. If it is less than 3 days (72 hours) until your next dose, skip the missed dose. Take your next dose on your normal day. Do not take 2 doses at the same time or extra doses. How do I store and/or throw out this drug?     Store in a refrigerator. Do not freeze. Do not use if it has been frozen. If needed, you may store at room temperature for up to 14 days. Write down the date you take this drug out of the refrigerator. If stored at room temperature and not used within 14 days, throw this drug away. Store in the original container to protect from light. Protect from heat. Keep all drugs in a safe place. Keep all drugs out of the reach of children and pets. Throw away unused or  drugs. Do not flush down a toilet or pour down a drain unless you are told to do so. Check with your pharmacist if you have questions about the best way to throw out drugs. There may be drug take-back programs in your area. General drug facts    If your symptoms or health problems do not get better or if they become worse, call your doctor. Do not share your drugs with others and do not take anyone else's drugs. Some drugs may have another patient information leaflet. If you have any questions about this drug, please talk with your doctor, nurse, pharmacist, or other health care provider. If you think there has been an overdose, call your poison control center or get medical care right away. Be ready to tell or show what was taken, how much, and when it happened. Last Reviewed Date2020-10-01  Consumer Information Use and Disclaimer    This generalized information is a limited summary of diagnosis, treatment, and/or medication information. It is not meant to be comprehensive and should be used as a tool to help the user understand and/or assess potential diagnostic and treatment options. It does NOT include all information about conditions, treatments, medications, side effects, or risks that may apply to a specific patient.  It is not intended to be medical advice or a substitute for the medical advice, diagnosis, or treatment of a health care provider based on the health care provider's examination and assessment of a patient's specific and unique circumstances. Patients must speak with a health care provider for complete information about their health, medical questions, and treatment options, including any risks or benefits regarding use of medications. This information does not endorse any treatments or medications as safe, effective, or approved for treating a specific patient. UpToDate, Inc. and its affiliates disclaim any warranty or liability relating to this information or the use thereof. The use of this information is governed by the Terms of Use, available at [employer]uy. com/en/know/clinical-effectiveness-terms. © 2022 UpToDate, Inc. and its affiliates and/or [address]. All rights reserved.

## 2022-07-27 DIAGNOSIS — F41.9 ANXIETY: Chronic | ICD-10-CM

## 2022-07-27 RX ORDER — BUSPIRONE HYDROCHLORIDE 5 MG/1
5 TABLET ORAL
Qty: 90 TABLET | Refills: 2 | Status: SHIPPED | OUTPATIENT
Start: 2022-07-27 | End: 2022-10-25

## 2022-07-28 ENCOUNTER — TELEPHONE (OUTPATIENT)
Dept: INTERNAL MEDICINE CLINIC | Age: 57
End: 2022-07-28

## 2022-08-23 DIAGNOSIS — E11.3299 TYPE 2 DIABETES MELLITUS WITH MILD NONPROLIFERATIVE RETINOPATHY, WITHOUT LONG-TERM CURRENT USE OF INSULIN, MACULAR EDEMA PRESENCE UNSPECIFIED, UNSPECIFIED LATERALITY (HCC): Primary | ICD-10-CM

## 2022-08-23 RX ORDER — DULAGLUTIDE 1.5 MG/.5ML
1.5 INJECTION, SOLUTION SUBCUTANEOUS
Qty: 6 ML | Refills: 3 | Status: SHIPPED | OUTPATIENT
Start: 2022-08-23 | End: 2022-09-21 | Stop reason: SDUPTHER

## 2022-09-21 DIAGNOSIS — E11.3299 TYPE 2 DIABETES MELLITUS WITH MILD NONPROLIFERATIVE RETINOPATHY, WITHOUT LONG-TERM CURRENT USE OF INSULIN, MACULAR EDEMA PRESENCE UNSPECIFIED, UNSPECIFIED LATERALITY (HCC): ICD-10-CM

## 2022-09-21 RX ORDER — DULAGLUTIDE 1.5 MG/.5ML
1.5 INJECTION, SOLUTION SUBCUTANEOUS
Qty: 6 ML | Refills: 3 | Status: SHIPPED | OUTPATIENT
Start: 2022-09-21 | End: 2022-10-17 | Stop reason: SDUPTHER

## 2022-09-21 NOTE — TELEPHONE ENCOUNTER
PCP: Zeeshan Chatman MD    Last appt: 2022  Future Appointments   Date Time Provider Brian Bronson   10/24/2022 10:00 AM LAB ONLY [facility] BS AMB   2023 10:15 AM Zeeshan Chatman MD [facility] AMB       Requested Prescriptions     Pending Prescriptions Disp Refills    dulaglutide (Trulicity) 1.5 LV/3.7 mL sub-q pen 6 mL 3     Si.5 mL by SubCUTAneous route every seven (7) days for 360 days.        Prior labs and Blood pressures:  BP Readings from Last 3 Encounters:   22 131/79   22 126/70   21 120/76     Lab Results   Component Value Date/Time    Sodium 139 2022 10:58 AM    Potassium 4.0 2022 10:58 AM    Chloride 104 2022 10:58 AM    CO2 27 2022 10:58 AM    Anion gap 8 2022 10:58 AM    Glucose 153 (H) 2022 10:58 AM    BUN 20 2022 10:58 AM    Creatinine 1.09 2022 10:58 AM    BUN/Creatinine ratio 18 2022 10:58 AM    GFR est AA >60 2022 10:58 AM    GFR est non-AA >60 2022 10:58 AM    Calcium 9.3 2022 10:58 AM     Lab Results   Component Value Date/Time    Hemoglobin A1c 8.1 (H) 2022 10:58 AM     Lab Results   Component Value Date/Time    Cholesterol, total 126 2022 10:27 AM    HDL Cholesterol 38 2022 10:27 AM    LDL, calculated 60.2 2022 10:27 AM    VLDL, calculated 27.8 2022 10:27 AM    Triglyceride 139 2022 10:27 AM    CHOL/HDL Ratio 3.3 2022 10:27 AM     No results found for: Roberth Bangura [id number]    Lab Results   Component Value Date/Time    TSH 1.59 2022 10:27 AM    TSH, 3rd generation 2.76 2021 09:22 AM

## 2022-09-21 NOTE — TELEPHONE ENCOUNTER
PCP: Adrienne Murrell MD    Last appt: 2022  Future Appointments   Date Time Provider Brian Bronson   10/24/2022 10:00 AM LAB ONLY [facility]Y HARMON   2023 10:15 AM Adrienne Murrell MD [facility] AMB       Requested Prescriptions     Pending Prescriptions Disp Refills    dulaglutide (Trulicity) 1.5 MW/9.6 mL sub-q pen 6 mL 3     Si.5 mL by SubCUTAneous route every seven (7) days for 360 days.          Other Comments:

## 2022-10-17 DIAGNOSIS — E11.3299 TYPE 2 DIABETES MELLITUS WITH MILD NONPROLIFERATIVE RETINOPATHY, WITHOUT LONG-TERM CURRENT USE OF INSULIN, MACULAR EDEMA PRESENCE UNSPECIFIED, UNSPECIFIED LATERALITY (HCC): ICD-10-CM

## 2022-10-17 RX ORDER — DULAGLUTIDE 1.5 MG/.5ML
1.5 INJECTION, SOLUTION SUBCUTANEOUS
Qty: 6 ML | Refills: 3 | Status: SHIPPED | OUTPATIENT
Start: 2022-10-17 | End: 2023-10-12

## 2022-10-17 NOTE — TELEPHONE ENCOUNTER
PCP: Ajit Davies MD    Last appt: 2022  Future Appointments   Date Time Provider Brian Bronson   10/24/2022 10:00 AM LAB ONLY [facility] AMB   2023 10:15 AM Ajit Davies MD [facility] AMB       Requested Prescriptions     Pending Prescriptions Disp Refills    dulaglutide (Trulicity) 1.5 DQ/3.7 mL sub-q pen 6 mL 3     Si.5 mL by SubCUTAneous route every seven (7) days for 360 days.        Prior labs and Blood pressures:  BP Readings from Last 3 Encounters:   22 131/79   22 126/70   21 120/76     Lab Results   Component Value Date/Time    Sodium 139 2022 10:58 AM    Potassium 4.0 2022 10:58 AM    Chloride 104 2022 10:58 AM    CO2 27 2022 10:58 AM    Anion gap 8 2022 10:58 AM    Glucose 153 (H) 2022 10:58 AM    BUN 20 2022 10:58 AM    Creatinine 1.09 2022 10:58 AM    BUN/Creatinine ratio 18 2022 10:58 AM    GFR est AA >60 2022 10:58 AM    GFR est non-AA >60 2022 10:58 AM    Calcium 9.3 2022 10:58 AM     Lab Results   Component Value Date/Time    Hemoglobin A1c 8.1 (H) 2022 10:58 AM     Lab Results   Component Value Date/Time    Cholesterol, total 126 2022 10:27 AM    HDL Cholesterol 38 2022 10:27 AM    LDL, calculated 60.2 2022 10:27 AM    VLDL, calculated 27.8 2022 10:27 AM    Triglyceride 139 2022 10:27 AM    CHOL/HDL Ratio 3.3 2022 10:27 AM     No results found for: BRYSON Werner    Lab Results   Component Value Date/Time    TSH 1.59 2022 10:27 AM    TSH, 3rd generation 2.76 2021 09:22 AM

## 2022-10-24 ENCOUNTER — LAB ONLY (OUTPATIENT)
Dept: INTERNAL MEDICINE CLINIC | Age: 57
End: 2022-10-24

## 2022-10-24 DIAGNOSIS — E11.3299 TYPE 2 DIABETES MELLITUS WITH MILD NONPROLIFERATIVE RETINOPATHY, WITHOUT LONG-TERM CURRENT USE OF INSULIN, MACULAR EDEMA PRESENCE UNSPECIFIED, UNSPECIFIED LATERALITY (HCC): Primary | ICD-10-CM

## 2022-10-25 DIAGNOSIS — F41.9 ANXIETY: Chronic | ICD-10-CM

## 2022-10-25 DIAGNOSIS — E11.3299 TYPE 2 DIABETES MELLITUS WITH MILD NONPROLIFERATIVE RETINOPATHY, WITHOUT LONG-TERM CURRENT USE OF INSULIN, MACULAR EDEMA PRESENCE UNSPECIFIED, UNSPECIFIED LATERALITY (HCC): Primary | ICD-10-CM

## 2022-10-25 LAB
ALBUMIN SERPL-MCNC: 4.4 G/DL (ref 3.5–5)
ALBUMIN/GLOB SERPL: 1.6 {RATIO} (ref 1.1–2.2)
ALP SERPL-CCNC: 75 U/L (ref 45–117)
ALT SERPL-CCNC: 32 U/L (ref 12–78)
ANION GAP SERPL CALC-SCNC: 12 MMOL/L (ref 5–15)
AST SERPL-CCNC: 24 U/L (ref 15–37)
BILIRUB SERPL-MCNC: 1 MG/DL (ref 0.2–1)
BUN SERPL-MCNC: 19 MG/DL (ref 6–20)
BUN/CREAT SERPL: 18 (ref 12–20)
CALCIUM SERPL-MCNC: 9.4 MG/DL (ref 8.5–10.1)
CHLORIDE SERPL-SCNC: 104 MMOL/L (ref 97–108)
CO2 SERPL-SCNC: 24 MMOL/L (ref 21–32)
CREAT SERPL-MCNC: 1.03 MG/DL (ref 0.7–1.3)
EST. AVERAGE GLUCOSE BLD GHB EST-MCNC: 192 MG/DL
GLOBULIN SER CALC-MCNC: 2.8 G/DL (ref 2–4)
GLUCOSE SERPL-MCNC: 171 MG/DL (ref 65–100)
HBA1C MFR BLD: 8.3 % (ref 4–5.6)
POTASSIUM SERPL-SCNC: 4 MMOL/L (ref 3.5–5.1)
PROT SERPL-MCNC: 7.2 G/DL (ref 6.4–8.2)
SODIUM SERPL-SCNC: 140 MMOL/L (ref 136–145)

## 2022-10-25 RX ORDER — BUSPIRONE HYDROCHLORIDE 5 MG/1
TABLET ORAL
Qty: 90 TABLET | Refills: 0 | Status: SHIPPED | OUTPATIENT
Start: 2022-10-25 | End: 2022-11-22

## 2022-10-25 NOTE — PROGRESS NOTES
Please call the patient regarding his diagnostic evaluation. Progressively worsening diabetes. This is despite recent addition of Trulicity. He needs to quit smoking. Please ensure that the patient has been adherent to his medications. He should be taking metformin 500 mg, 2 tablets twice daily, in addition to Trulicity 1.5 mg once weekly, Jardiance 10 mg daily, and glipizide 10 mg twice daily. Given this degree of poor control, with A1c target less than 6.5%, his [place] Edinger is going to increase to 25 mg daily. Repeat renal function is indicated about 1 to 2 weeks after this change. Please have him schedule in person visit. We may need to consider insulin if this does not improve.

## 2022-11-16 ENCOUNTER — LAB ONLY (OUTPATIENT)
Dept: INTERNAL MEDICINE CLINIC | Age: 57
End: 2022-11-16

## 2022-11-16 DIAGNOSIS — E11.3299 TYPE 2 DIABETES MELLITUS WITH MILD NONPROLIFERATIVE RETINOPATHY, WITHOUT LONG-TERM CURRENT USE OF INSULIN, MACULAR EDEMA PRESENCE UNSPECIFIED, UNSPECIFIED LATERALITY (HCC): ICD-10-CM

## 2022-11-16 LAB
ALBUMIN SERPL-MCNC: 4.1 G/DL (ref 3.5–5)
ANION GAP SERPL CALC-SCNC: 7 MMOL/L (ref 5–15)
BUN SERPL-MCNC: 17 MG/DL (ref 6–20)
BUN/CREAT SERPL: 18 (ref 12–20)
CALCIUM SERPL-MCNC: 9.1 MG/DL (ref 8.5–10.1)
CHLORIDE SERPL-SCNC: 106 MMOL/L (ref 97–108)
CO2 SERPL-SCNC: 27 MMOL/L (ref 21–32)
CREAT SERPL-MCNC: 0.95 MG/DL (ref 0.7–1.3)
GLUCOSE SERPL-MCNC: 153 MG/DL (ref 65–100)
PHOSPHATE SERPL-MCNC: 3.6 MG/DL (ref 2.6–4.7)
POTASSIUM SERPL-SCNC: 3.6 MMOL/L (ref 3.5–5.1)
SODIUM SERPL-SCNC: 140 MMOL/L (ref 136–145)

## 2022-11-22 DIAGNOSIS — F41.9 ANXIETY: Chronic | ICD-10-CM

## 2022-11-22 RX ORDER — BUSPIRONE HYDROCHLORIDE 5 MG/1
TABLET ORAL
Qty: 90 TABLET | Refills: 0 | Status: SHIPPED | OUTPATIENT
Start: 2022-11-22

## 2022-12-23 DIAGNOSIS — F41.9 ANXIETY: Chronic | ICD-10-CM

## 2022-12-23 RX ORDER — BUSPIRONE HYDROCHLORIDE 5 MG/1
TABLET ORAL
Qty: 90 TABLET | Refills: 0 | Status: SHIPPED | OUTPATIENT
Start: 2022-12-23

## 2023-01-17 DIAGNOSIS — E11.3299 TYPE 2 DIABETES MELLITUS WITH MILD NONPROLIFERATIVE RETINOPATHY, WITHOUT LONG-TERM CURRENT USE OF INSULIN, MACULAR EDEMA PRESENCE UNSPECIFIED, UNSPECIFIED LATERALITY (HCC): ICD-10-CM

## 2023-01-17 RX ORDER — DULAGLUTIDE 1.5 MG/.5ML
1.5 INJECTION, SOLUTION SUBCUTANEOUS
Qty: 6 ML | Refills: 3 | Status: SHIPPED | OUTPATIENT
Start: 2023-01-17 | End: 2024-01-12

## 2023-01-17 NOTE — TELEPHONE ENCOUNTER
Requested Prescriptions     Pending Prescriptions Disp Refills    dulaglutide (Trulicity) 1.5 AE/9.9 mL sub-q pen 6 mL 3     Si.5 mL by SubCUTAneous route every seven (7) days for 360 days. RX refill request from the patient/pharmacy. Patient last seen 22 with labs, and next appt. scheduled for 23.

## 2023-01-23 DIAGNOSIS — F41.9 ANXIETY: Chronic | ICD-10-CM

## 2023-01-23 RX ORDER — BUSPIRONE HYDROCHLORIDE 5 MG/1
TABLET ORAL
Qty: 90 TABLET | Refills: 0 | Status: SHIPPED | OUTPATIENT
Start: 2023-01-23

## 2023-01-30 NOTE — PROGRESS NOTES
ICD-10-CM ICD-9-CM    1. Routine adult health maintenance  Z00.00 V70.0 CBC WITH AUTOMATED DIFF      2. 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 Mixed hyperlipidemia  E78.2 272.2 LIPID PANEL      atorvastatin (LIPITOR) 40 mg tablet      4. History of stroke  Z86.73 V12.54                Subjective:     Chief Complaint   Patient presents with    Follow-up       Jose Carlos Hooks is a 62 y.o. M.  he  has a past medical history of Anxiety, Diabetes ([facility] Essential hypertension, History of nuclear stress test (06/2020), History of stroke (01/2018), Hyperlipidemia, Long-term use of high-risk medication (03/26/2019), and Smokeless tobacco use.     his last appointment in this clinic was 7/19/2022 . I reviewed and updated the medical record. When last seen in July 2022, he had reported feeling generally fine. He was felt to be tolerating metformin, glipizide, and Jardiance for his diabetes, and felt to be tolerating atorvastatin 40 mg daily for his hypercholesterolemia. Physical examination at the time had been notable for obesity with a BMI of approximately 33 kg/m². Laboratory studies were ordered and he was otherwise continued on his usual medication regimen. Today, the patient comes in for routine follow-up on his chronic medical concerns. He reports feeling fine overall today and has no significant acute medical plaints. Since his last visit, other than increasing the Trulicity and starting an increased dose of Jardiance, there have been no other significant clinical changes.     He continues on a combination of metformin 1000 mg twice daily, Trulicity 1.5 mg weekly, and Jardiance 25 mg daily for his diabetes. Previously, the patient had been on glipizide 10 mg 2 times daily but had discontinued this. He denies having had any hypoglycemic episodes, polyuria, or polydipsia. Of note, the patient has lost about 20 to 25 pounds over the past several months with dietary modifications and with the use of the Trulicity and Jardiance. He continues on atorvastatin 40 mg daily. He has been taking this for some time given the context of his diabetes with a history of prior CVA. He denies having had any myalgias or GI symptoms with the use of this medication. No new focal neurological symptoms. No recent chest pain, shortness of breath, or any further cardiopulmonary concerns. His review of systems is otherwise negative. He is using smokeless tobacco, and is precontemplative regarding discontinuation. Routine Healthcare Maintenance issues are reviewed and discussed with the patient as noted below. Orders to update gaps in healthcare maintenance were placed as noted below in the Assessment and Plan, where applicable. Past Medical History:  Past Medical History:   Diagnosis Date    Anxiety     Diabetes ([facility]     Essential hypertension     History of nuclear stress test 06/2020    LVEF 68%, no ischemia    History of stroke 01/2018    history of cerebellar stroke    Hyperlipidemia     Long-term use of high-risk medication 03/26/2019    Smokeless tobacco use        Past Surgical Histor:  Past Surgical History:   Procedure Laterality Date    COLONOSCOPY N/A 11/13/2019    COLONOSCOPY performed by Luis Carlos Sarmiento MD at [facility] AMBULATORY OR       Allergies:  No Known Allergies    Medications:  Current Outpatient Medications   Medication Sig Dispense Refill    metFORMIN ER (GLUCOPHAGE XR) 500 mg tablet Take 2 Tablets by mouth two (2) times a day for 360 days. 360 Tablet 3    atorvastatin (LIPITOR) 40 mg tablet Take 1 Tablet by mouth daily for 360 days.  90 Tablet 3    busPIRone (BUSPAR) 5 mg tablet TAKE 1 TABLET BY MOUTH THREE TIMES DAILY WITH MEALS 90 Tablet 0    dulaglutide (Trulicity) 1.5 MV/5.5 mL sub-q pen 0.5 mL by SubCUTAneous route every seven (7) days for 360 days. 6 mL 3    empagliflozin (JARDIANCE) 25 mg tablet Take 1 Tablet by mouth daily for 360 days. Indications: type 2 diabetes mellitus 90 Tablet 3    pantoprazole (PROTONIX) 40 mg tablet Take 1 tablet by mouth twice daily 180 Tablet 3    aspirin delayed-release 81 mg tablet Take  by mouth daily.          Social History:  Social History     Socioeconomic History    Marital status: SINGLE   Occupational History    Occupation: [employer]   Tobacco Use    Smoking status: Never    Smokeless tobacco: Current     Types: Snuff    Tobacco comments:     snuff   Vaping Use    Vaping Use: Never used   Substance and Sexual Activity    Alcohol use: No    Drug use: No       Family History:  Family History   Problem Relation Age of Onset    Coronary Art Dis Maternal Grandmother     Coronary Art Dis Paternal Grandmother     Cancer Mother         vulvar    Diabetes Father        Immunizations:  Immunization History   Administered Date(s) Administered    COVID-19, MODERNA PURPLE border, (age 18y+ booster, 6y-11y series), IM, 50 mcg/0.5 mL 11/18/2022    Influenza Vaccine 10/01/2020, 10/23/2021, 11/10/2022    Influenza, FLUARIX, FLULAVAL, FLUZONE (age 10 mo+) AND AFLURIA, (age 1 y+), PF, 0.5mL 03/07/2020    Pneumococcal Polysaccharide (PPSV-23) 10/23/2021    Tdap 07/19/2022    Zoster Recombinant 07/19/2022        Healthcare Maintenance:  Health Maintenance   Topic Date Due    COVID-19 Vaccine (1) Never done    Hepatitis B Vaccine (1 of 3 - Risk 3-dose series) Never done    Shingles Vaccine (2 of 2) 09/13/2022    Diabetic Alb to Cr ratio (uACR) test  01/07/2023    Lipid Screen  01/07/2023    Depression Screen  07/19/2023    Foot Exam Q1  07/19/2023    A1C test (Diabetic or Prediabetic)  10/24/2023    GFR test (Diabetes, CKD 3-4, OR last GFR 15-59)  11/16/2023    Eye Exam Retinal or Dilated  10/20/2024    Colorectal Cancer Screening Combo  11/13/2029    DTaP/Tdap/Td series (2 - Td or Tdap) 07/19/2032    Hepatitis C Screening  Completed    Flu Vaccine  Completed    Pneumococcal 0-64 years  Completed        Review of Systems:  ROS:  Review of Systems   Constitutional: Negative. HENT: Negative. Eyes: Negative. Respiratory: Negative. Cardiovascular: Negative. Gastrointestinal: Negative. Genitourinary: Negative. Musculoskeletal: Negative. Skin: Negative. Neurological: Negative. Endo/Heme/Allergies: Negative. Psychiatric/Behavioral: Negative. ROS otherwise negative      Objective:     Vital Signs:  Visit Vitals  /74 (BP 1 Location: Left upper arm, BP Patient Position: Sitting, BP Cuff Size: Large adult)   Pulse 81   Resp 18   Ht 6' 3\" (1.905 m)   Wt 244 lb 3.2 oz (110.8 kg)   SpO2 98%   BMI 30.52 kg/m²       BMI:  Body mass index is 30.52 kg/m². Physical Examination:  Physical Exam  Vitals reviewed. Constitutional:       Appearance: Normal appearance. HENT:      Head: Normocephalic and atraumatic. Nose: Nose normal.      Mouth/Throat:      Mouth: Mucous membranes are moist.   Eyes:      Extraocular Movements: Extraocular movements intact. Conjunctiva/sclera: Conjunctivae normal.      Pupils: Pupils are equal, round, and reactive to light. Cardiovascular:      Rate and Rhythm: Normal rate and regular rhythm. Pulses: Normal pulses. Heart sounds: Normal heart sounds. No murmur heard. No friction rub. No gallop. Pulmonary:      Effort: Pulmonary effort is normal. No respiratory distress. Breath sounds: Normal breath sounds. No wheezing, rhonchi or rales. Abdominal:      General: Bowel sounds are normal. There is no distension. Palpations: Abdomen is soft. There is no mass. Tenderness: There is no abdominal tenderness. There is no guarding or rebound.    Musculoskeletal:         General: No tenderness, deformity or signs of injury. Normal range of motion. Cervical back: Normal range of motion and neck supple. Right lower leg: No edema. Left lower leg: No edema. Skin:     General: Skin is warm and dry. Findings: No bruising, lesion or rash. Neurological:      General: No focal deficit present. Mental Status: He is alert and oriented to person, place, and time. Mental status is at baseline. Cranial Nerves: No cranial nerve deficit. Sensory: No sensory deficit. Motor: No weakness. Coordination: Coordination normal.      Gait: Gait normal.      Deep Tendon Reflexes: Reflexes normal.   Psychiatric:         Mood and Affect: Mood normal.         Behavior: Behavior normal.        Physical exam otherwise negative    Diagnostic Testing:    Laboratory Studies:  Lab Only on 11/16/2022   Component Date Value Ref Range Status    Sodium 11/16/2022 140  136 - 145 mmol/L Final    Potassium 11/16/2022 3.6  3.5 - 5.1 mmol/L Final    Chloride 11/16/2022 106  97 - 108 mmol/L Final    CO2 11/16/2022 27  21 - 32 mmol/L Final    Anion gap 11/16/2022 7  5 - 15 mmol/L Final    Glucose 11/16/2022 153 (A)  65 - 100 mg/dL Final    BUN 11/16/2022 17  6 - 20 MG/DL Final    Creatinine 11/16/2022 0.95  0.70 - 1.30 MG/DL Final    BUN/Creatinine ratio 11/16/2022 18  12 - 20   Final    eGFR 11/16/2022 >60  >60 ml/min/1.73m2 Final    Comment:   Pediatric calculator link: [email or web address]/gfr_[email or web address]    Effective Oct 3, 2022    These results are not intended for use in patients <25years of age. eGFR results are calculated without a race factor using  the 2021 CKD-EPI equation. Careful clinical correlation is recommended, particularly when comparing to results calculated using previous equations.   The CKD-EPI equation is less accurate in patients with extremes of muscle mass, extra-renal metabolism of creatinine, excessive creatine ingestion, or following therapy that affects renal tubular secretion. Calcium 11/16/2022 9.1  8.5 - 10.1 MG/DL Final    Phosphorus 11/16/2022 3.6  2.6 - 4.7 MG/DL Final    Albumin 11/16/2022 4.1  3.5 - 5.0 g/dL Final   Lab Only on 10/24/2022   Component Date Value Ref Range Status    Hemoglobin A1c 10/24/2022 8.3 (A)  4.0 - 5.6 % Final    Comment: NEW METHOD  PLEASE NOTE NEW REFERENCE RANGE  (NOTE)  HbA1C Interpretive Ranges  <5.7              Normal  5.7 - 6.4         Consider Prediabetes  >6.5              Consider Diabetes      Est. average glucose 10/24/2022 192  mg/dL Final    Sodium 10/24/2022 140  136 - 145 mmol/L Final    Potassium 10/24/2022 4.0  3.5 - 5.1 mmol/L Final    Chloride 10/24/2022 104  97 - 108 mmol/L Final    CO2 10/24/2022 24  21 - 32 mmol/L Final    Anion gap 10/24/2022 12  5 - 15 mmol/L Final    Glucose 10/24/2022 171 (A)  65 - 100 mg/dL Final    BUN 10/24/2022 19  6 - 20 MG/DL Final    Creatinine 10/24/2022 1.03  0.70 - 1.30 MG/DL Final    BUN/Creatinine ratio 10/24/2022 18  12 - 20   Final    eGFR 10/24/2022 >60  >60 ml/min/1.73m2 Final    Comment:   Pediatric calculator link: [email or web address] [email or web address]/gfr_[email or web address]    Effective Oct 3, 2022    These results are not intended for use in patients <25years of age. eGFR results are calculated without a race factor using  the 2021 CKD-EPI equation. Careful clinical correlation is recommended, particularly when comparing to results calculated using previous equations. The CKD-EPI equation is less accurate in patients with extremes of muscle mass, extra-renal metabolism of creatinine, excessive creatine ingestion, or following therapy that affects renal tubular secretion. Calcium 10/24/2022 9.4  8.5 - 10.1 MG/DL Final    Bilirubin, total 10/24/2022 1.0  0.2 - 1.0 MG/DL Final    ALT (SGPT) 10/24/2022 32  12 - 78 U/L Final    AST (SGOT) 10/24/2022 24  15 - 37 U/L Final    Alk.  phosphatase 10/24/2022 75  45 - 117 U/L Final    Protein, total 10/24/2022 7.2  6.4 - 8.2 g/dL Final    Albumin 10/24/2022 4.4  3.5 - 5.0 g/dL Final    Globulin 10/24/2022 2.8  2.0 - 4.0 g/dL Final    A-G Ratio 10/24/2022 1.6  1.1 - 2.2   Final         Radiographic Studies:  XR Results (most recent):  Results from Hospital Encounter encounter on 05/24/20    XR CHEST PA LAT    Narrative  Indication:  3 weeks intermittent chest pain, normal exam    Exam: PA and lateral views of the chest.    Direct comparison is made to prior CXR dated April 2012. Findings: Cardiomediastinal silhouette is within normal limits. Lungs are clear  bilaterally. Pleural spaces are normal. Osseous structures are intact. Impression  IMPRESSION: No acute cardiopulmonary disease. NANCY Results (most recent):  No results found for this or any previous visit. CT Results (most recent):  Results from Hospital Encounter encounter on 01/31/18    CT HEAD WO CONT    Narrative  INDICATION: Vertigo, persistent, central    EXAM: CT HEAD without contrast.  CT dose reduction was achieved through use of a standardized protocol tailored  for this examination and automatic exposure control for dose modulation. FINDINGS: Unenhanced CT Head is performed. The brain parenchyma is unremarkable  in appearance for age, without evidence for infarct. There is no bleed, mass,  shift, hydrocephalus or extra-axial fluid collection. Bone windows are  unremarkable. Impression  IMPRESSION: No Intracranial Disease Evident on Head CT. DEXA Results (most recent):  No results found for this or any previous visit. MRI Results (most recent):  No results found for this or any previous visit. Assessment/Plan:       ICD-10-CM ICD-9-CM    1. Routine adult health maintenance  Z00.00 V70.0 CBC WITH AUTOMATED DIFF      2.  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 Mixed hyperlipidemia  E78.2 272.2 LIPID PANEL      atorvastatin (LIPITOR) 40 mg tablet      4. History of stroke  Z86.73 V12.54                  Healthcare Maintenance:  - Preventive measures are reviewed as per above  - Up to date on routine interventions except as noted above  - Orders placed to update gaps as noted  - Notes: Labs ordered      Tobacco Use:   - Smoking History: smokeless tobacco, former smoker   - Discussion with Patient today: I advised patient to quit, and offered support. Educational material distributed. Discussed current use pattern. Asked pt to inform me when sets quit date. . Time of Discussion: 5 minutes. - Notes: ---      Diabetes Mellitus:   - Type: Type 2 Diabetes   - Current A1C:   Lab Results   Component Value Date/Time    Hemoglobin A1c 8.3 (H) 10/24/2022 10:12 AM       - Target G5E: <7.8%   - Complications: retinopathy   - Relevant Diabetes Medications:  Key Antihyperglycemic Medications               metFORMIN ER (GLUCOPHAGE XR) 500 mg tablet (Taking) Take 2 Tablets by mouth two (2) times a day for 360 days. dulaglutide (Trulicity) 1.5 SK/0.9 mL sub-q pen (Taking) 0.5 mL by SubCUTAneous route every seven (7) days for 360 days. empagliflozin (JARDIANCE) 25 mg tablet (Taking) Take 1 Tablet by mouth daily for 360 days.  Indications: type 2 diabetes mellitus              - General Recommendations discussed today: diabetic diet discussed in detail, written exchange diet given, all medications, side effects and compliance discussed carefully, and annual eye examinations at Ophthalmology discussed   - Notes: checking A1C, consider increasing trulicity but note context of retinopathy; continue eye examinations, consider insulin        Hyperlipidemia/Dyslipidemia:   - Summary of Cardiovascular Risks and Goals:     LDL goal is under 80  diabetic  hypertension  hyperlipidemia  former smoker  obese  prior CVA/TIA or known carotid artery disease   -   Lab Results   Component Value Date/Time    LDL, calculated 60.2 01/07/2022 10:27 AM    HDL Cholesterol 38 01/07/2022 10:27 AM       - Relevant Cholesterol Meds:  Key Antihyperlipidemia Meds               atorvastatin (LIPITOR) 40 mg tablet (Taking) Take 1 Tablet by mouth daily for 360 days. - Cholesterol at target: yes   - Does patient meet USPSTF and ACC/AHA indications for pharmacotherapy (e.g., statin): yes   - GI symptoms with meds: NO   - Muscle aches with meds: NO   - Other Adverse effects with meds: NO   - Medication Plan: continue   - Notes: CCM, labs pending    ASCVD:   - Type: history of CVA (cerebellar)   - Current Symptoms: No Symptoms, no angina   - History and Contributing Factors: elevated cholesterol, hypertension, and known history of coronary artery disease  Key CAD CHF Meds               atorvastatin (LIPITOR) 40 mg tablet (Taking) Take 1 Tablet by mouth daily for 360 days. aspirin delayed-release 81 mg tablet (Taking) Take  by mouth daily. - Target BP: < 130/80 mmHg; see Blood Pressure section   - Statin? YES   - Antiplatelet and/or Anticoagulant? YES   - ACEI/ARB? NO   - Beta Blocker? NO   - Notes: CCM, labs pending        I have reviewed the patient's medical history in detail and updated the computerized patient record. We had a prolonged discussion about these complex clinical issues and went over the various important aspects to consider. All questions were answered. Advised the patient to call back or return to office if symptoms do not improve, change in nature, or persist.     The patient was given an after visit summary or informed of [email or web address] Access which includes patient instructions, diagnoses, current medications, & vitals. he expressed understanding with the diagnosis and plan.      Rafaela Valiente MD    Please note that this dictation was completed with Dragon, the computer voice recognition software. Quite often unanticipated grammatical, syntax, homophones, and other interpretive errors are inadvertently transcribed by the computer software. Please disregard these errors. Please excuse any errors that have escaped final proofreading.

## 2023-02-01 ENCOUNTER — OFFICE VISIT (OUTPATIENT)
Dept: INTERNAL MEDICINE CLINIC | Age: 58
End: 2023-02-01
Payer: MEDICAID

## 2023-02-01 VITALS
RESPIRATION RATE: 18 BRPM | WEIGHT: 244.2 LBS | BODY MASS INDEX: 30.36 KG/M2 | DIASTOLIC BLOOD PRESSURE: 74 MMHG | HEART RATE: 81 BPM | HEIGHT: 75 IN | OXYGEN SATURATION: 98 % | SYSTOLIC BLOOD PRESSURE: 130 MMHG

## 2023-02-01 DIAGNOSIS — Z86.73 HISTORY OF STROKE: ICD-10-CM

## 2023-02-01 DIAGNOSIS — E78.2 MIXED HYPERLIPIDEMIA: ICD-10-CM

## 2023-02-01 DIAGNOSIS — Z00.00 ROUTINE ADULT HEALTH MAINTENANCE: Primary | ICD-10-CM

## 2023-02-01 DIAGNOSIS — E11.3299 TYPE 2 DIABETES MELLITUS WITH MILD NONPROLIFERATIVE RETINOPATHY, WITHOUT LONG-TERM CURRENT USE OF INSULIN, MACULAR EDEMA PRESENCE UNSPECIFIED, UNSPECIFIED LATERALITY (HCC): ICD-10-CM

## 2023-02-01 PROCEDURE — 3075F SYST BP GE 130 - 139MM HG: CPT | Performed by: INTERNAL MEDICINE

## 2023-02-01 PROCEDURE — 99214 OFFICE O/P EST MOD 30 MIN: CPT | Performed by: INTERNAL MEDICINE

## 2023-02-01 PROCEDURE — 3078F DIAST BP <80 MM HG: CPT | Performed by: INTERNAL MEDICINE

## 2023-02-01 RX ORDER — ATORVASTATIN CALCIUM 40 MG/1
40 TABLET, FILM COATED ORAL DAILY
Qty: 90 TABLET | Refills: 3 | Status: SHIPPED | OUTPATIENT
Start: 2023-02-01 | End: 2024-01-27

## 2023-02-01 RX ORDER — METFORMIN HYDROCHLORIDE 500 MG/1
1000 TABLET, EXTENDED RELEASE ORAL 2 TIMES DAILY
Qty: 360 TABLET | Refills: 3 | Status: SHIPPED | OUTPATIENT
Start: 2023-02-01 | End: 2024-01-27

## 2023-02-01 NOTE — PROGRESS NOTES
Chief Complaint   Patient presents with    Follow-up   Visit Vitals  /74 (BP 1 Location: Left upper arm, BP Patient Position: Sitting, BP Cuff Size: Large adult)   Pulse 81   Resp 18   Ht 6' 3\" (1.905 m)   Wt 244 lb 3.2 oz (110.8 kg)   SpO2 98%   BMI 30.52 kg/m²         1. \"Have you been to the ER, urgent care clinic since your last visit? Hospitalized since your last visit? \" No    2. \"Have you seen or consulted any other health care providers outside of the [address] [place] [address] since your last visit? \" No     3. For patients aged 39-70: Has the patient had a colonoscopy / FIT/ Cologuard? No      If the patient is female:    4. For patients aged 41-77: Has the patient had a mammogram within the past 2 years? No      5. For patients aged 21-65: Has the patient had a pap smear?  No

## 2023-02-02 LAB
ALBUMIN SERPL-MCNC: 4.5 G/DL (ref 3.5–5)
ANION GAP SERPL CALC-SCNC: 8 MMOL/L (ref 5–15)
BASOPHILS # BLD: 0.1 K/UL (ref 0–0.1)
BASOPHILS NFR BLD: 1 % (ref 0–1)
BUN SERPL-MCNC: 23 MG/DL (ref 6–20)
BUN/CREAT SERPL: 24 (ref 12–20)
CALCIUM SERPL-MCNC: 9.6 MG/DL (ref 8.5–10.1)
CHLORIDE SERPL-SCNC: 105 MMOL/L (ref 97–108)
CHOLEST SERPL-MCNC: 103 MG/DL
CO2 SERPL-SCNC: 26 MMOL/L (ref 21–32)
CREAT SERPL-MCNC: 0.94 MG/DL (ref 0.7–1.3)
CREAT UR-MCNC: 119 MG/DL
DIFFERENTIAL METHOD BLD: NORMAL
EOSINOPHIL # BLD: 0.3 K/UL (ref 0–0.4)
EOSINOPHIL NFR BLD: 3 % (ref 0–7)
ERYTHROCYTE [DISTWIDTH] IN BLOOD BY AUTOMATED COUNT: 13 % (ref 11.5–14.5)
EST. AVERAGE GLUCOSE BLD GHB EST-MCNC: 154 MG/DL
GLUCOSE SERPL-MCNC: 122 MG/DL (ref 65–100)
HBA1C MFR BLD: 7 % (ref 4–5.6)
HCT VFR BLD AUTO: 48.2 % (ref 36.6–50.3)
HDLC SERPL-MCNC: 43 MG/DL
HDLC SERPL: 2.4 (ref 0–5)
HGB BLD-MCNC: 15.3 G/DL (ref 12.1–17)
IMM GRANULOCYTES # BLD AUTO: 0 K/UL (ref 0–0.04)
IMM GRANULOCYTES NFR BLD AUTO: 0 % (ref 0–0.5)
LDLC SERPL CALC-MCNC: 30.8 MG/DL (ref 0–100)
LYMPHOCYTES # BLD: 1.5 K/UL (ref 0.8–3.5)
LYMPHOCYTES NFR BLD: 18 % (ref 12–49)
MCH RBC QN AUTO: 28.8 PG (ref 26–34)
MCHC RBC AUTO-ENTMCNC: 31.7 G/DL (ref 30–36.5)
MCV RBC AUTO: 90.8 FL (ref 80–99)
MICROALBUMIN UR-MCNC: 0.93 MG/DL
MICROALBUMIN/CREAT UR-RTO: 8 MG/G (ref 0–30)
MONOCYTES # BLD: 0.6 K/UL (ref 0–1)
MONOCYTES NFR BLD: 7 % (ref 5–13)
NEUTS SEG # BLD: 6 K/UL (ref 1.8–8)
NEUTS SEG NFR BLD: 71 % (ref 32–75)
NRBC # BLD: 0 K/UL (ref 0–0.01)
NRBC BLD-RTO: 0 PER 100 WBC
PHOSPHATE SERPL-MCNC: 3.8 MG/DL (ref 2.6–4.7)
PLATELET # BLD AUTO: 236 K/UL (ref 150–400)
PMV BLD AUTO: 10.1 FL (ref 8.9–12.9)
POTASSIUM SERPL-SCNC: 3.7 MMOL/L (ref 3.5–5.1)
RBC # BLD AUTO: 5.31 M/UL (ref 4.1–5.7)
SODIUM SERPL-SCNC: 139 MMOL/L (ref 136–145)
TRIGL SERPL-MCNC: 146 MG/DL (ref ?–150)
VLDLC SERPL CALC-MCNC: 29.2 MG/DL
WBC # BLD AUTO: 8.5 K/UL (ref 4.1–11.1)

## 2023-02-22 DIAGNOSIS — F41.9 ANXIETY: Chronic | ICD-10-CM

## 2023-02-22 RX ORDER — BUSPIRONE HYDROCHLORIDE 5 MG/1
TABLET ORAL
Qty: 90 TABLET | Refills: 0 | Status: SHIPPED | OUTPATIENT
Start: 2023-02-22

## 2023-03-23 DIAGNOSIS — F41.9 ANXIETY: Chronic | ICD-10-CM

## 2023-03-23 RX ORDER — BUSPIRONE HYDROCHLORIDE 5 MG/1
TABLET ORAL
Qty: 90 TABLET | Refills: 0 | Status: SHIPPED | OUTPATIENT
Start: 2023-03-23

## 2023-04-21 DIAGNOSIS — E11.3299 TYPE 2 DIABETES MELLITUS WITH MILD NONPROLIFERATIVE RETINOPATHY, WITHOUT LONG-TERM CURRENT USE OF INSULIN, MACULAR EDEMA PRESENCE UNSPECIFIED, UNSPECIFIED LATERALITY (HCC): Primary | ICD-10-CM

## 2023-04-22 DIAGNOSIS — F41.9 ANXIETY: Chronic | ICD-10-CM

## 2023-04-22 RX ORDER — BUSPIRONE HYDROCHLORIDE 5 MG/1
TABLET ORAL
Qty: 90 TABLET | Refills: 0 | Status: SHIPPED | OUTPATIENT
Start: 2023-04-22

## 2023-05-22 RX ORDER — BUSPIRONE HYDROCHLORIDE 5 MG/1
TABLET ORAL
Qty: 270 TABLET | Refills: 0 | Status: SHIPPED | OUTPATIENT
Start: 2023-05-22

## 2023-05-22 NOTE — TELEPHONE ENCOUNTER
Requested Prescriptions     Pending Prescriptions Disp Refills    busPIRone (BUSPAR) 5 MG tablet [Pharmacy Med Name: busPIRone HCl 5 MG Oral Tablet] 90 tablet 0     Sig: TAKE 1 TABLET BY MOUTH THREE TIMES DAILY WITH MEALS       RX refill request from the patient/pharmacy. Patient last seen 2/1/23 with labs, and next appt. scheduled for 10/11/23.

## 2023-06-19 RX ORDER — BUSPIRONE HYDROCHLORIDE 5 MG/1
TABLET ORAL
Qty: 270 TABLET | Refills: 0 | Status: SHIPPED | OUTPATIENT
Start: 2023-06-19

## 2023-06-19 NOTE — TELEPHONE ENCOUNTER
PCP: Beni Quezada MD    Last appt: 2/1/23  Future Appointments   Date Time Provider Carin De Leon   10/11/2023  9:00 AM John Herrera MD [facility] BS AMB       Last refilled:5/22/23    Requested Prescriptions     Pending Prescriptions Disp Refills    busPIRone (BUSPAR) 5 MG tablet 270 tablet 0     Sig: TAKE 1 TABLET BY MOUTH THREE TIMES DAILY WITH MEALS    empagliflozin (JARDIANCE) 25 MG tablet 30 tablet 1     Sig: Take 1 tablet by mouth daily

## 2023-06-19 NOTE — TELEPHONE ENCOUNTER
Pharmacy: [facility]     busPIRone (BUSPAR) 5 mg tablet 90 Tablet 0 4/22/2023     Sig: TAKE 1 TABLET BY MOUTH THREE TIMES DAILY WITH MEALS      empagliflozin (JARDIANCE) 25 mg tablet 90 Tablet 3 10/25/2022 10/20/2023    Sig - Route: Take 1 Tablet by mouth daily for 360 days.  Indications: type 2 diabetes mellitus - Oral

## 2023-07-07 RX ORDER — DULAGLUTIDE 1.5 MG/.5ML
1.5 INJECTION, SOLUTION SUBCUTANEOUS
Qty: 6 ML | Refills: 3 | Status: SHIPPED | OUTPATIENT
Start: 2023-07-07 | End: 2024-07-01

## 2023-07-07 NOTE — TELEPHONE ENCOUNTER
Pharmacy: [facility], 3 month supply    dulaglutide (Trulicity) 1.5 IR/4.5 mL sub-q pen 6 mL 3 4/11/2023 4/5/2024    Sig - Route: 0.5 mL by SubCUTAneous route every seven (7) days for 360 days. - SubCUTAneous

## 2023-07-07 NOTE — TELEPHONE ENCOUNTER
Requested Prescriptions     Pending Prescriptions Disp Refills    dulaglutide (TRULICITY) 1.5 EI/7.1KB SC injection 6 mL 3     Sig: Inject 0.5 mLs into the skin every 7 days       RX refill request from the patient/pharmacy. Patient last seen 2/1/23 with labs, and next appt. scheduled for 10/11/23.

## 2023-07-12 DIAGNOSIS — K21.9 GASTROESOPHAGEAL REFLUX DISEASE WITHOUT ESOPHAGITIS: Primary | ICD-10-CM

## 2023-07-12 RX ORDER — PANTOPRAZOLE SODIUM 40 MG/1
40 TABLET, DELAYED RELEASE ORAL 2 TIMES DAILY
Qty: 180 TABLET | Refills: 0 | Status: SHIPPED | OUTPATIENT
Start: 2023-07-12

## 2023-07-20 NOTE — TELEPHONE ENCOUNTER
Pharmacy: [facility]    busPIRone (BUSPAR) 5 MG tablet [[id number]]     Order Details  Dose, Route, Frequency: As Directed   Dispense Quantity: 270 tablet Refills: 0          Sig: TAKE 1 TABLET BY MOUTH THREE TIMES DAILY WITH MEALS   empagliflozin (JARDIANCE) 25 MG tablet [[id number]]     Order Details  Dose: 25 mg Route: Oral Frequency: DAILY   Dispense Quantity: 30 tablet Refills: 1          Sig: Take 1 tablet by mouth daily

## 2023-07-21 RX ORDER — BUSPIRONE HYDROCHLORIDE 5 MG/1
TABLET ORAL
Qty: 270 TABLET | Refills: 0 | Status: SHIPPED | OUTPATIENT
Start: 2023-07-21

## 2023-07-21 NOTE — TELEPHONE ENCOUNTER
Requested Prescriptions     Pending Prescriptions Disp Refills    busPIRone (BUSPAR) 5 MG tablet 270 tablet 0     Sig: TAKE 1 TABLET BY MOUTH THREE TIMES DAILY WITH MEALS    empagliflozin (JARDIANCE) 25 MG tablet 90 tablet 1     Sig: Take 1 tablet by mouth daily       RX refill request from the patient/pharmacy. Patient last seen 2/1/23 with labs, and next appt. scheduled for 10/11/23.

## 2023-08-03 RX ORDER — METFORMIN HYDROCHLORIDE 500 MG/1
1000 TABLET, EXTENDED RELEASE ORAL 2 TIMES DAILY
Qty: 360 TABLET | Refills: 0 | Status: SHIPPED | OUTPATIENT
Start: 2023-08-03 | End: 2024-07-28

## 2023-08-03 NOTE — TELEPHONE ENCOUNTER
PCP: Teresa Hanson MD    Last appt: 2/1/2023  Future Appointments   Date Time Provider [address]   10/11/2023  9:00 AM Sherrell Saba MD [facility] BS AMB       Requested Prescriptions     Pending Prescriptions Disp Refills    metFORMIN (GLUCOPHAGE-XR) 500 MG extended release tablet 360 tablet 0     Sig: Take 2 tablets by mouth 2 times daily       Prior labs and Blood pressures:  BP Readings from Last 3 Encounters:   02/01/23 130/74   07/19/22 131/79   01/07/22 126/70     Lab Results   Component Value Date/Time     02/01/2023 12:36 PM    K 3.7 02/01/2023 12:36 PM     02/01/2023 12:36 PM    CO2 26 02/01/2023 12:36 PM    BUN 23 02/01/2023 12:36 PM    GFRAA >60 07/19/2022 10:58 AM     No results found for: HBA1C, [id number]  Lab Results   Component Value Date/Time    CHOL 103 02/01/2023 12:36 PM    HDL 43 02/01/2023 12:36 PM    VLDL 29 01/06/2021 09:22 AM     No results found for: VITD3, VD3RIA        Lab Results   Component Value Date/Time    TSH 1.59 01/07/2022 10:27 AM

## 2023-08-03 NOTE — TELEPHONE ENCOUNTER
Pharmacy: [address] [place] [address]    metFORMIN ER (GLUCOPHAGE XR) 500 mg tablet 360 Tablet 3 2/1/2023 1/27/2024    Sig - Route:  Take 2 Tablets by mouth two (2) times a day for 360 days. - Oral

## 2023-08-22 NOTE — TELEPHONE ENCOUNTER
Pharmacy: [address] [place] [address]    busPIRone (BUSPAR) 5 MG tablet [[id number]]     Order Details  Dose, Route, Frequency: As Directed   Dispense Quantity: 270 tablet Refills: 0          Sig: TAKE 1 TABLET BY MOUTH THREE TIMES DAILY WITH MEALS

## 2023-08-22 NOTE — TELEPHONE ENCOUNTER
Last Refill: 7-21-23  Last Visit: 2/1/2023 Vick Sharpe  Next Visit: Visit date not found     Requested Prescriptions     Pending Prescriptions Disp Refills    busPIRone (BUSPAR) 5 MG tablet 270 tablet 0     Sig: TAKE 1 TABLET BY MOUTH THREE TIMES DAILY WITH MEALS

## 2023-08-23 RX ORDER — BUSPIRONE HYDROCHLORIDE 5 MG/1
TABLET ORAL
Qty: 270 TABLET | Refills: 0 | Status: SHIPPED | OUTPATIENT
Start: 2023-08-23

## 2023-09-05 NOTE — TELEPHONE ENCOUNTER
Pharmacy: [address] [place] [address]     Disp Refills Start End    atorvastatin (LIPITOR) 40 mg tablet 90 Tablet 3 2/1/2023 1/27/2024    Sig - Route:  Take 1 Tablet by mouth daily for 360 days. - Oral

## 2023-09-06 NOTE — TELEPHONE ENCOUNTER
Requested Prescriptions     Pending Prescriptions Disp Refills    atorvastatin (LIPITOR) 40 MG tablet 90 tablet 0     Sig: Take 1 tablet by mouth daily       RX refill request from the patient/pharmacy. Patient last seen 2/1/23 with labs, and next appt. scheduled for 10/11/23.

## 2023-09-07 RX ORDER — ATORVASTATIN CALCIUM 40 MG/1
40 TABLET, FILM COATED ORAL DAILY
Qty: 90 TABLET | Refills: 0 | Status: SHIPPED | OUTPATIENT
Start: 2023-09-07 | End: 2024-09-01

## 2023-09-21 NOTE — TELEPHONE ENCOUNTER
Pharmacy: [address] [place] [address]    busPIRone (BUSPAR) 5 MG tablet [[id number]]     Order Details  Dose, Route, Frequency: As Directed   Dispense Quantity: 270 tablet Refills: 0          Sig: TAKE 1 TABLET BY MOUTH THREE TIMES DAILY WITH MEALS

## 2023-09-22 NOTE — TELEPHONE ENCOUNTER
Chief Complaint   Patient presents with    Medication Refill       Requested Prescriptions     Pending Prescriptions Disp Refills    busPIRone (BUSPAR) 5 MG tablet 270 tablet 0     Sig: TAKE 1 TABLET BY MOUTH THREE TIMES DAILY WITH MEALS       Allergies:  No Known Allergies    Last visit with clinic:  2/1/2023   Next visit with clinic: Visit date not found     Last visit with this provider: 2/1/2023   Next Visit with this provider: Visit date not found    Signed by Jd SONG  09/22/23  8:15 AM

## 2023-09-23 RX ORDER — BUSPIRONE HYDROCHLORIDE 5 MG/1
TABLET ORAL
Qty: 270 TABLET | Refills: 0 | Status: SHIPPED | OUTPATIENT
Start: 2023-09-23

## 2023-10-11 ENCOUNTER — OFFICE VISIT (OUTPATIENT)
Age: 58
End: 2023-10-11
Payer: COMMERCIAL

## 2023-10-11 VITALS
RESPIRATION RATE: 18 BRPM | HEART RATE: 70 BPM | TEMPERATURE: 98.1 F | OXYGEN SATURATION: 97 % | SYSTOLIC BLOOD PRESSURE: 125 MMHG | DIASTOLIC BLOOD PRESSURE: 77 MMHG | WEIGHT: 240.2 LBS | HEIGHT: 75 IN | BODY MASS INDEX: 29.86 KG/M2

## 2023-10-11 DIAGNOSIS — E66.9 CLASS 1 OBESITY WITH SERIOUS COMORBIDITY AND BODY MASS INDEX (BMI) OF 30.0 TO 30.9 IN ADULT, UNSPECIFIED OBESITY TYPE: ICD-10-CM

## 2023-10-11 DIAGNOSIS — Z86.73 PERSONAL HISTORY OF TRANSIENT ISCHEMIC ATTACK (TIA), AND CEREBRAL INFARCTION WITHOUT RESIDUAL DEFICITS: ICD-10-CM

## 2023-10-11 DIAGNOSIS — E78.2 MIXED HYPERLIPIDEMIA: ICD-10-CM

## 2023-10-11 DIAGNOSIS — F41.9 ANXIETY DISORDER, UNSPECIFIED TYPE: ICD-10-CM

## 2023-10-11 DIAGNOSIS — Z11.4 SCREENING FOR HIV (HUMAN IMMUNODEFICIENCY VIRUS): ICD-10-CM

## 2023-10-11 DIAGNOSIS — E11.319 TYPE 2 DIABETES MELLITUS WITH RETINOPATHY, WITHOUT LONG-TERM CURRENT USE OF INSULIN, MACULAR EDEMA PRESENCE UNSPECIFIED, UNSPECIFIED LATERALITY, UNSPECIFIED RETINOPATHY SEVERITY (HCC): Primary | ICD-10-CM

## 2023-10-11 DIAGNOSIS — Z23 IMMUNIZATION DUE: ICD-10-CM

## 2023-10-11 DIAGNOSIS — I10 ESSENTIAL (PRIMARY) HYPERTENSION: ICD-10-CM

## 2023-10-11 LAB
ALBUMIN SERPL-MCNC: 4.4 G/DL (ref 3.5–5)
ALBUMIN/GLOB SERPL: 1.6 (ref 1.1–2.2)
ALP SERPL-CCNC: 77 U/L (ref 45–117)
ALT SERPL-CCNC: 27 U/L (ref 12–78)
ANION GAP SERPL CALC-SCNC: 7 MMOL/L (ref 5–15)
AST SERPL-CCNC: 16 U/L (ref 15–37)
BASOPHILS # BLD: 0.1 K/UL (ref 0–0.1)
BASOPHILS NFR BLD: 2 % (ref 0–1)
BILIRUB SERPL-MCNC: 0.9 MG/DL (ref 0.2–1)
BUN SERPL-MCNC: 19 MG/DL (ref 6–20)
BUN/CREAT SERPL: 20 (ref 12–20)
CALCIUM SERPL-MCNC: 9.6 MG/DL (ref 8.5–10.1)
CHLORIDE SERPL-SCNC: 103 MMOL/L (ref 97–108)
CHOLEST SERPL-MCNC: 108 MG/DL
CO2 SERPL-SCNC: 29 MMOL/L (ref 21–32)
CREAT SERPL-MCNC: 0.95 MG/DL (ref 0.7–1.3)
CREAT UR-MCNC: 87.1 MG/DL
DIFFERENTIAL METHOD BLD: ABNORMAL
EOSINOPHIL # BLD: 0.4 K/UL (ref 0–0.4)
EOSINOPHIL NFR BLD: 4 % (ref 0–7)
ERYTHROCYTE [DISTWIDTH] IN BLOOD BY AUTOMATED COUNT: 13.1 % (ref 11.5–14.5)
EST. AVERAGE GLUCOSE BLD GHB EST-MCNC: 171 MG/DL
GLOBULIN SER CALC-MCNC: 2.8 G/DL (ref 2–4)
GLUCOSE SERPL-MCNC: 156 MG/DL (ref 65–100)
HBA1C MFR BLD: 7.6 % (ref 4–5.6)
HCT VFR BLD AUTO: 48.8 % (ref 36.6–50.3)
HDLC SERPL-MCNC: 42 MG/DL
HDLC SERPL: 2.6 (ref 0–5)
HGB BLD-MCNC: 15.8 G/DL (ref 12.1–17)
IMM GRANULOCYTES # BLD AUTO: 0.1 K/UL (ref 0–0.04)
IMM GRANULOCYTES NFR BLD AUTO: 1 % (ref 0–0.5)
LDLC SERPL CALC-MCNC: 42.4 MG/DL (ref 0–100)
LYMPHOCYTES # BLD: 2 K/UL (ref 0.8–3.5)
LYMPHOCYTES NFR BLD: 23 % (ref 12–49)
MCH RBC QN AUTO: 29.6 PG (ref 26–34)
MCHC RBC AUTO-ENTMCNC: 32.4 G/DL (ref 30–36.5)
MCV RBC AUTO: 91.6 FL (ref 80–99)
MICROALBUMIN UR-MCNC: <0.5 MG/DL
MICROALBUMIN/CREAT UR-RTO: <6 MG/G (ref 0–30)
MONOCYTES # BLD: 0.6 K/UL (ref 0–1)
MONOCYTES NFR BLD: 7 % (ref 5–13)
NEUTS SEG # BLD: 5.8 K/UL (ref 1.8–8)
NEUTS SEG NFR BLD: 63 % (ref 32–75)
NRBC # BLD: 0 K/UL (ref 0–0.01)
NRBC BLD-RTO: 0 PER 100 WBC
PLATELET # BLD AUTO: 226 K/UL (ref 150–400)
PMV BLD AUTO: 10.2 FL (ref 8.9–12.9)
POTASSIUM SERPL-SCNC: 3.9 MMOL/L (ref 3.5–5.1)
PROT SERPL-MCNC: 7.2 G/DL (ref 6.4–8.2)
RBC # BLD AUTO: 5.33 M/UL (ref 4.1–5.7)
SODIUM SERPL-SCNC: 139 MMOL/L (ref 136–145)
SPECIMEN HOLD: NORMAL
TRIGL SERPL-MCNC: 118 MG/DL
VLDLC SERPL CALC-MCNC: 23.6 MG/DL
WBC # BLD AUTO: 9 K/UL (ref 4.1–11.1)

## 2023-10-11 PROCEDURE — 3051F HG A1C>EQUAL 7.0%<8.0%: CPT | Performed by: INTERNAL MEDICINE

## 2023-10-11 PROCEDURE — 3074F SYST BP LT 130 MM HG: CPT | Performed by: INTERNAL MEDICINE

## 2023-10-11 PROCEDURE — 3078F DIAST BP <80 MM HG: CPT | Performed by: INTERNAL MEDICINE

## 2023-10-11 PROCEDURE — 99204 OFFICE O/P NEW MOD 45 MIN: CPT | Performed by: INTERNAL MEDICINE

## 2023-10-11 PROCEDURE — 90471 IMMUNIZATION ADMIN: CPT | Performed by: INTERNAL MEDICINE

## 2023-10-11 PROCEDURE — 90674 CCIIV4 VAC NO PRSV 0.5 ML IM: CPT | Performed by: INTERNAL MEDICINE

## 2023-10-11 SDOH — ECONOMIC STABILITY: FOOD INSECURITY: WITHIN THE PAST 12 MONTHS, YOU WORRIED THAT YOUR FOOD WOULD RUN OUT BEFORE YOU GOT MONEY TO BUY MORE.: NEVER TRUE

## 2023-10-11 SDOH — ECONOMIC STABILITY: FOOD INSECURITY: WITHIN THE PAST 12 MONTHS, THE FOOD YOU BOUGHT JUST DIDN'T LAST AND YOU DIDN'T HAVE MONEY TO GET MORE.: NEVER TRUE

## 2023-10-11 SDOH — ECONOMIC STABILITY: HOUSING INSECURITY
IN THE LAST 12 MONTHS, WAS THERE A TIME WHEN YOU DID NOT HAVE A STEADY PLACE TO SLEEP OR SLEPT IN A SHELTER (INCLUDING NOW)?: NO

## 2023-10-11 SDOH — ECONOMIC STABILITY: INCOME INSECURITY: HOW HARD IS IT FOR YOU TO PAY FOR THE VERY BASICS LIKE FOOD, HOUSING, MEDICAL CARE, AND HEATING?: NOT HARD AT ALL

## 2023-10-11 NOTE — PROGRESS NOTES
PROGRESS NOTE  Name: Tae Garza   : 1965       ASSESSMENT/ PLAN:     Diagnoses and all orders for this visit:    Type 2 diabetes mellitus with retinopathy, without long-term current use of insulin, macular edema presence unspecified, unspecified laterality, unspecified retinopathy severity ([address])  -     CBC with Auto Differential; Future  -     Comprehensive Metabolic Panel; Future  -     Hemoglobin A1C; Future  -     Lipid Panel; Future  -     Microalbumin / Creatinine Urine Ratio; Future  -     HM DIABETES FOOT EXAM    Immunization due  -     Influenza, FLUCELVAX, (age 10 mo+), IM, Preservative Free, 0.5 mL    Essential (primary) hypertension    Mixed hyperlipidemia    Personal history of transient ischemic attack (TIA), and cerebral infarction without residual deficits    Anxiety disorder, unspecified type    Class 1 obesity with serious comorbidity and body mass index (BMI) of 30.0 to 30.9 in adult, unspecified obesity type    Screening for HIV (human immunodeficiency virus)  -     HIV 1/2 Ag/Ab, 4TH Generation,W Rflx Confirm; Future      Return in about 4 months (around 2024) for Diabetes Mellitus. I have reviewed the patient's medications and risks/side effects/benefits were discussed. Diagnosis(-es) explained to patient and questions answered. Literature provided where appropriate. SUBJECTIVE:   Mr. Tae Garza is a 62 y.o. male who presents today for follow up. Previously he saw Dr. Charlie Valdez. He has diabetes, and was most recently started on Trulicity. He has lost weight, and DM improved, yet he wonders if he can get off it. He denies side effects. He has anxiety. BusPar has been working well for him. No SI. Weight:   Wt Readings from Last 3 Encounters:   10/11/23 240 lb 3.2 oz (109 kg)   23 244 lb 3.2 oz (110.8 kg)   22 266 lb 12.8 oz (121 kg)       Hypercholesterolemia is currently managed on Lipitor.   He denies muscle soreness or GI

## 2023-10-11 NOTE — PROGRESS NOTES
1. \"Have you been to the ER, urgent care clinic since your last visit? Hospitalized since your last visit? \" No    2. \"Have you seen or consulted any other health care providers outside of the [address] [place] [address] since your last visit? \" No     3. For patients aged 43-73: Has the patient had a colonoscopy / FIT/ Cologuard?  Yes - no Care Gap present

## 2023-10-12 LAB
HIV 1+2 AB+HIV1 P24 AG SERPL QL IA: NONREACTIVE
HIV 1/2 RESULT COMMENT: NORMAL

## 2023-11-16 ENCOUNTER — TELEPHONE (OUTPATIENT)
Age: 58
End: 2023-11-16

## 2023-11-16 NOTE — TELEPHONE ENCOUNTER
Patient states he needs the name of a [facility] Doctor he can see per Dr. Shahida Cottrell recommendations. Please call to advise & a detailed message can be left on voice mail or patient states a Text Message can be sent with info if possible.  Thank you

## 2023-11-24 NOTE — TELEPHONE ENCOUNTER
I advised the patient to call his insurance and find out who is in network. Patient verbalized understanding of information discussed w/ no further questions at this time.

## 2024-02-19 RX ORDER — EMPAGLIFLOZIN 25 MG/1
25 TABLET, FILM COATED ORAL DAILY
Qty: 90 TABLET | Refills: 1 | Status: SHIPPED | OUTPATIENT
Start: 2024-02-19

## 2024-02-19 NOTE — TELEPHONE ENCOUNTER
PCP: Adam Stuart MD    Last appt: 10/11/2023    No future appointments.    Requested Prescriptions     Pending Prescriptions Disp Refills    JARDIANCE 25 MG tablet [Pharmacy Med Name: Jardiance 25 MG Oral Tablet] 90 tablet 1     Sig: Take 1 tablet by mouth once daily

## 2024-05-08 RX ORDER — METFORMIN HYDROCHLORIDE 500 MG/1
1000 TABLET, EXTENDED RELEASE ORAL 2 TIMES DAILY
Qty: 360 TABLET | Refills: 3 | Status: SHIPPED | OUTPATIENT
Start: 2024-05-08 | End: 2025-05-03

## 2024-05-08 NOTE — TELEPHONE ENCOUNTER
PCP: Adam Stuart MD    Last appt:   10/11/2023    No future appointments.    Requested Prescriptions     Pending Prescriptions Disp Refills    metFORMIN (GLUCOPHAGE-XR) 500 MG extended release tablet 360 tablet 0     Sig: Take 2 tablets by mouth 2 times daily

## 2024-05-08 NOTE — TELEPHONE ENCOUNTER
metFORMIN (GLUCOPHAGE-XR) 500 MG extended release tablet    Refill to [facility] #[phone]  [place]

## 2024-08-26 ENCOUNTER — OFFICE VISIT (OUTPATIENT)
Age: 59
End: 2024-08-26
Payer: MEDICAID

## 2024-08-26 VITALS
TEMPERATURE: 97.2 F | WEIGHT: 244 LBS | OXYGEN SATURATION: 97 % | SYSTOLIC BLOOD PRESSURE: 127 MMHG | BODY MASS INDEX: 30.34 KG/M2 | RESPIRATION RATE: 16 BRPM | HEART RATE: 71 BPM | HEIGHT: 75 IN | DIASTOLIC BLOOD PRESSURE: 74 MMHG

## 2024-08-26 DIAGNOSIS — I10 ESSENTIAL (PRIMARY) HYPERTENSION: ICD-10-CM

## 2024-08-26 DIAGNOSIS — E78.2 MIXED HYPERLIPIDEMIA: ICD-10-CM

## 2024-08-26 DIAGNOSIS — E11.319 TYPE 2 DIABETES MELLITUS WITH RETINOPATHY, WITHOUT LONG-TERM CURRENT USE OF INSULIN, MACULAR EDEMA PRESENCE UNSPECIFIED, UNSPECIFIED LATERALITY, UNSPECIFIED RETINOPATHY SEVERITY (HCC): ICD-10-CM

## 2024-08-26 DIAGNOSIS — E11.319 TYPE 2 DIABETES MELLITUS WITH RETINOPATHY, WITHOUT LONG-TERM CURRENT USE OF INSULIN, MACULAR EDEMA PRESENCE UNSPECIFIED, UNSPECIFIED LATERALITY, UNSPECIFIED RETINOPATHY SEVERITY (HCC): Primary | ICD-10-CM

## 2024-08-26 DIAGNOSIS — F41.9 ANXIETY DISORDER, UNSPECIFIED TYPE: ICD-10-CM

## 2024-08-26 DIAGNOSIS — K21.9 GASTROESOPHAGEAL REFLUX DISEASE WITHOUT ESOPHAGITIS: ICD-10-CM

## 2024-08-26 PROCEDURE — 3074F SYST BP LT 130 MM HG: CPT | Performed by: INTERNAL MEDICINE

## 2024-08-26 PROCEDURE — 99214 OFFICE O/P EST MOD 30 MIN: CPT | Performed by: INTERNAL MEDICINE

## 2024-08-26 PROCEDURE — 3078F DIAST BP <80 MM HG: CPT | Performed by: INTERNAL MEDICINE

## 2024-08-26 RX ORDER — ATORVASTATIN CALCIUM 40 MG/1
40 TABLET, FILM COATED ORAL DAILY
Qty: 90 TABLET | Refills: 0 | Status: SHIPPED | OUTPATIENT
Start: 2024-08-26 | End: 2025-08-21

## 2024-08-26 RX ORDER — DULAGLUTIDE 1.5 MG/.5ML
1.5 INJECTION, SOLUTION SUBCUTANEOUS
Qty: 6 ML | Refills: 3 | Status: SHIPPED | OUTPATIENT
Start: 2024-08-26 | End: 2025-08-21

## 2024-08-26 ASSESSMENT — PATIENT HEALTH QUESTIONNAIRE - PHQ9
SUM OF ALL RESPONSES TO PHQ QUESTIONS 1-9: 0
1. LITTLE INTEREST OR PLEASURE IN DOING THINGS: NOT AT ALL
SUM OF ALL RESPONSES TO PHQ9 QUESTIONS 1 & 2: 0
2. FEELING DOWN, DEPRESSED OR HOPELESS: NOT AT ALL
SUM OF ALL RESPONSES TO PHQ QUESTIONS 1-9: 0

## 2024-08-26 NOTE — PROGRESS NOTES
\"Have you been to the ER, urgent care clinic since your last visit?  Hospitalized since your last visit?\"    NO    “Have you seen or consulted any other health care providers outside of [facility] since your last visit?”    NO            Click Here for Release of Records Request

## 2024-08-26 NOTE — PROGRESS NOTES
PROGRESS NOTE  Name: Beny Alejandre Jr   : 1965       ASSESSMENT/ PLAN:     Type 2 diabetes mellitus with retinopathy (HCC): Not quite controlled at last check. Resume Trulicity.   -     CBC with Auto Differential; Future  -     Comprehensive Metabolic Panel; Future  -     Hemoglobin A1C; Future  -     Lipid Panel; Future  -     Microalbumin / Creatinine Urine Ratio; Future  -     HM DIABETES FOOT EXAM    Essential (primary) hypertension: BP is fine today.     Mixed hyperlipidemia: Recheck labs.     Personal history of transient ischemic attack (TIA), and cerebral infarction without residual deficits    Anxiety disorder, unspecified type: Doing well.     Class 1 obesity with serious comorbidity and body mass index (BMI) of 30.0 to 30.9 in adult, unspecified obesity type: Resume Trulicity.     Return in about 6 months (around 2025) for Hypertension, Diabetes Mellitus.     I have reviewed the patient's medications and risks/side effects/benefits were discussed. Diagnosis(-es) explained to patient and questions answered. Literature provided where appropriate.                    SUBJECTIVE:   Mr. Beny Alejandre Jr is a 59 y.o. male who presents today for follow up.  Previously he saw Dr. Rodriguez.     He has diabetes, and stopped Trulicity; \"I am off it now but would like to restart.\" At last visit, he noted that had lost weight, and DM improved, yet he wonder if he can get off it. He denies side effects.     He has anxiety. BusPar had been working well for him. Today he is off this and doing well. No SI.     Weight:   Wt Readings from Last 3 Encounters:   24 110.7 kg (244 lb)   10/11/23 109 kg (240 lb 3.2 oz)   23 110.8 kg (244 lb 3.2 oz)       Hypercholesterolemia is currently managed on Lipitor.  He denies muscle soreness or GI upset.  The patient does have a prior history of cerebellar stroke and does remain on a baby aspirin daily.  He denies exertional chest pains, claudication or

## 2024-08-27 LAB
ALBUMIN SERPL-MCNC: 4.2 G/DL (ref 3.5–5)
ALBUMIN/GLOB SERPL: 1.4 (ref 1.1–2.2)
ALP SERPL-CCNC: 71 U/L (ref 45–117)
ALT SERPL-CCNC: 17 U/L (ref 12–78)
ANION GAP SERPL CALC-SCNC: 7 MMOL/L (ref 5–15)
AST SERPL-CCNC: 9 U/L (ref 15–37)
BASOPHILS # BLD: 0.1 K/UL (ref 0–0.1)
BASOPHILS NFR BLD: 2 % (ref 0–1)
BILIRUB SERPL-MCNC: 0.6 MG/DL (ref 0.2–1)
BUN SERPL-MCNC: 15 MG/DL (ref 6–20)
BUN/CREAT SERPL: 14 (ref 12–20)
CALCIUM SERPL-MCNC: 9.2 MG/DL (ref 8.5–10.1)
CHLORIDE SERPL-SCNC: 106 MMOL/L (ref 97–108)
CHOLEST SERPL-MCNC: 182 MG/DL
CO2 SERPL-SCNC: 27 MMOL/L (ref 21–32)
CREAT SERPL-MCNC: 1.07 MG/DL (ref 0.7–1.3)
CREAT UR-MCNC: 96 MG/DL
DIFFERENTIAL METHOD BLD: ABNORMAL
EOSINOPHIL # BLD: 0.3 K/UL (ref 0–0.4)
EOSINOPHIL NFR BLD: 4 % (ref 0–7)
ERYTHROCYTE [DISTWIDTH] IN BLOOD BY AUTOMATED COUNT: 13 % (ref 11.5–14.5)
EST. AVERAGE GLUCOSE BLD GHB EST-MCNC: 174 MG/DL
GLOBULIN SER CALC-MCNC: 3.1 G/DL (ref 2–4)
GLUCOSE SERPL-MCNC: 198 MG/DL (ref 65–100)
HBA1C MFR BLD: 7.7 % (ref 4–5.6)
HCT VFR BLD AUTO: 46.5 % (ref 36.6–50.3)
HDLC SERPL-MCNC: 39 MG/DL
HDLC SERPL: 4.7 (ref 0–5)
HGB BLD-MCNC: 15.3 G/DL (ref 12.1–17)
IMM GRANULOCYTES # BLD AUTO: 0.1 K/UL (ref 0–0.04)
IMM GRANULOCYTES NFR BLD AUTO: 1 % (ref 0–0.5)
LDLC SERPL CALC-MCNC: 117.4 MG/DL (ref 0–100)
LYMPHOCYTES # BLD: 1.3 K/UL (ref 0.8–3.5)
LYMPHOCYTES NFR BLD: 18 % (ref 12–49)
MCH RBC QN AUTO: 29.8 PG (ref 26–34)
MCHC RBC AUTO-ENTMCNC: 32.9 G/DL (ref 30–36.5)
MCV RBC AUTO: 90.5 FL (ref 80–99)
MICROALBUMIN UR-MCNC: 0.73 MG/DL
MICROALBUMIN/CREAT UR-RTO: 8 MG/G (ref 0–30)
MONOCYTES # BLD: 0.5 K/UL (ref 0–1)
MONOCYTES NFR BLD: 7 % (ref 5–13)
NEUTS SEG # BLD: 5.3 K/UL (ref 1.8–8)
NEUTS SEG NFR BLD: 68 % (ref 32–75)
NRBC # BLD: 0 K/UL (ref 0–0.01)
NRBC BLD-RTO: 0 PER 100 WBC
PLATELET # BLD AUTO: 218 K/UL (ref 150–400)
PMV BLD AUTO: 10.3 FL (ref 8.9–12.9)
POTASSIUM SERPL-SCNC: 3.8 MMOL/L (ref 3.5–5.1)
PROT SERPL-MCNC: 7.3 G/DL (ref 6.4–8.2)
RBC # BLD AUTO: 5.14 M/UL (ref 4.1–5.7)
SODIUM SERPL-SCNC: 140 MMOL/L (ref 136–145)
SPECIMEN HOLD: NORMAL
TRIGL SERPL-MCNC: 128 MG/DL
VLDLC SERPL CALC-MCNC: 25.6 MG/DL
WBC # BLD AUTO: 7.7 K/UL (ref 4.1–11.1)

## 2024-09-03 ENCOUNTER — TELEPHONE (OUTPATIENT)
Age: 59
End: 2024-09-03

## 2024-10-24 RX ORDER — EMPAGLIFLOZIN 25 MG/1
25 TABLET, FILM COATED ORAL DAILY
Qty: 90 TABLET | Refills: 3 | Status: SHIPPED | OUTPATIENT
Start: 2024-10-24

## 2024-10-24 NOTE — TELEPHONE ENCOUNTER
PCP: Adam Stuart MD    Last appt: 8/26/2024    No future appointments.    Requested Prescriptions     Pending Prescriptions Disp Refills    JARDIANCE 25 MG tablet [Pharmacy Med Name: Jardiance 25 MG Oral Tablet] 90 tablet 0     Sig: Take 1 tablet by mouth once daily

## 2024-12-27 RX ORDER — ATORVASTATIN CALCIUM 40 MG/1
40 TABLET, FILM COATED ORAL DAILY
Qty: 90 TABLET | Refills: 0 | Status: SHIPPED | OUTPATIENT
Start: 2024-12-27

## 2025-03-31 RX ORDER — ATORVASTATIN CALCIUM 40 MG/1
40 TABLET, FILM COATED ORAL DAILY
Qty: 90 TABLET | Refills: 3 | Status: SHIPPED | OUTPATIENT
Start: 2025-03-31

## 2025-06-02 RX ORDER — METFORMIN HYDROCHLORIDE 500 MG/1
1000 TABLET, EXTENDED RELEASE ORAL 2 TIMES DAILY
Qty: 360 TABLET | Refills: 3 | Status: SHIPPED | OUTPATIENT
Start: 2025-06-02

## 2025-06-11 NOTE — TELEPHONE ENCOUNTER
PCP: Adam Stuart MD    Last appt: 8/26/2024   No future appointments.    Requested Prescriptions     Pending Prescriptions Disp Refills    metFORMIN (GLUCOPHAGE-XR) 500 MG extended release tablet 360 tablet 3     Sig: Take 2 tablets by mouth 2 times daily       Prior labs and Blood pressures:  BP Readings from Last 3 Encounters:   08/26/24 127/74   10/11/23 125/77   02/01/23 130/74     Lab Results   Component Value Date/Time     08/26/2024 11:35 AM    K 3.8 08/26/2024 11:35 AM     08/26/2024 11:35 AM    CO2 27 08/26/2024 11:35 AM    BUN 15 08/26/2024 11:35 AM    GFRAA >60 07/19/2022 10:58 AM     No results found for: \"HBA1C\", \"[id number]\"  Lab Results   Component Value Date/Time    CHOL 182 08/26/2024 11:35 AM    HDL 39 08/26/2024 11:35 AM    .4 08/26/2024 11:35 AM    LDL 42.4 10/11/2023 09:55 AM    VLDL 25.6 08/26/2024 11:35 AM    VLDL 29 01/06/2021 09:22 AM     No results found for: \"VITD3\"    Lab Results   Component Value Date/Time    TSH 1.59 01/07/2022 10:27 AM    TSH 2.76 01/06/2021 09:22 AM

## 2025-06-13 RX ORDER — METFORMIN HYDROCHLORIDE 500 MG/1
1000 TABLET, EXTENDED RELEASE ORAL 2 TIMES DAILY
Qty: 360 TABLET | Refills: 3 | Status: SHIPPED | OUTPATIENT
Start: 2025-06-13

## (undated) DEVICE — ENDO CARRY-ON PROCEDURE KIT INCLUDES ENZYMATIC SPONGE, GAUZE, BIOHAZARD LABEL, TRAY, LUBRICANT, DIRTY SCOPE LABEL, WATER LABEL, TRAY, DRAWSTRING PAD, AND DEFENDO 4-PIECE KIT.: Brand: ENDO CARRY-ON PROCEDURE KIT

## (undated) DEVICE — KIT,1200CC CANISTER,3/16"X6' TUBING: Brand: MEDLINE INDUSTRIES, INC.

## (undated) DEVICE — BASIN EMSIS 16OZ GRAPHITE PLAS KID SHP MOLD GRAD FOR ORAL

## (undated) DEVICE — SOLUTION LACTATED RINGERS INJECTION USP

## (undated) DEVICE — CONTINU-FLO SOLUTION SET, 2 INJECTION SITES, MALE LUER LOCK ADAPTER WITH RETRACTABLE COLLAR, LARGE BORE STOPCOCK WITH ROTATING MALE LUER LOCK EXTENSION SET, 2 INJECTION SITES, MALE LUER LOCK ADAPTER WITH RETRACTABLE COLLAR: Brand: INTERLINK/CONTINU-FLO

## (undated) DEVICE — FORCEPS BX L160CM DIA8MM GRSP DISECT CUP TIP NONLOCKING ROT

## (undated) DEVICE — KENDALL DL ECG CABLE AND LEAD WIRE SYSTEM, 3-LEAD, SINGLE PATIENT USE: Brand: KENDALL